# Patient Record
Sex: FEMALE | Race: WHITE | NOT HISPANIC OR LATINO | URBAN - METROPOLITAN AREA
[De-identification: names, ages, dates, MRNs, and addresses within clinical notes are randomized per-mention and may not be internally consistent; named-entity substitution may affect disease eponyms.]

---

## 2018-04-27 ENCOUNTER — INPATIENT (INPATIENT)
Facility: HOSPITAL | Age: 28
LOS: 3 days | Discharge: HOME | End: 2018-05-01
Attending: OBSTETRICS & GYNECOLOGY | Admitting: OBSTETRICS & GYNECOLOGY

## 2018-04-27 VITALS
SYSTOLIC BLOOD PRESSURE: 122 MMHG | TEMPERATURE: 98 F | DIASTOLIC BLOOD PRESSURE: 74 MMHG | RESPIRATION RATE: 16 BRPM | HEART RATE: 69 BPM

## 2018-04-27 DIAGNOSIS — Z34.90 ENCOUNTER FOR SUPERVISION OF NORMAL PREGNANCY, UNSPECIFIED, UNSPECIFIED TRIMESTER: ICD-10-CM

## 2018-04-27 DIAGNOSIS — A49.1 STREPTOCOCCAL INFECTION, UNSPECIFIED SITE: ICD-10-CM

## 2018-04-27 LAB
BASOPHILS # BLD AUTO: 0.04 K/UL — SIGNIFICANT CHANGE UP (ref 0–0.2)
BASOPHILS NFR BLD AUTO: 0.3 % — SIGNIFICANT CHANGE UP (ref 0–1)
BLD GP AB SCN SERPL QL: SIGNIFICANT CHANGE UP
EOSINOPHIL # BLD AUTO: 0.01 K/UL — SIGNIFICANT CHANGE UP (ref 0–0.7)
EOSINOPHIL NFR BLD AUTO: 0.1 % — SIGNIFICANT CHANGE UP (ref 0–8)
HCT VFR BLD CALC: 33.3 % — LOW (ref 37–47)
HGB BLD-MCNC: 11.5 G/DL — LOW (ref 12–16)
IMM GRANULOCYTES NFR BLD AUTO: 0.8 % — HIGH (ref 0.1–0.3)
LYMPHOCYTES # BLD AUTO: 1.37 K/UL — SIGNIFICANT CHANGE UP (ref 1.2–3.4)
LYMPHOCYTES # BLD AUTO: 10.3 % — LOW (ref 20.5–51.1)
MCHC RBC-ENTMCNC: 29 PG — SIGNIFICANT CHANGE UP (ref 27–31)
MCHC RBC-ENTMCNC: 34.5 G/DL — SIGNIFICANT CHANGE UP (ref 32–37)
MCV RBC AUTO: 84.1 FL — SIGNIFICANT CHANGE UP (ref 81–99)
MONOCYTES # BLD AUTO: 0.64 K/UL — HIGH (ref 0.1–0.6)
MONOCYTES NFR BLD AUTO: 4.8 % — SIGNIFICANT CHANGE UP (ref 1.7–9.3)
NEUTROPHILS # BLD AUTO: 11.08 K/UL — HIGH (ref 1.4–6.5)
NEUTROPHILS NFR BLD AUTO: 83.7 % — HIGH (ref 42.2–75.2)
NRBC # BLD: 0 /100 WBCS — SIGNIFICANT CHANGE UP (ref 0–0)
PLATELET # BLD AUTO: 133 K/UL — SIGNIFICANT CHANGE UP (ref 130–400)
PRENATAL SYPHILIS TEST: SIGNIFICANT CHANGE UP
RBC # BLD: 3.96 M/UL — LOW (ref 4.2–5.4)
RBC # FLD: 14.2 % — SIGNIFICANT CHANGE UP (ref 11.5–14.5)
TYPE + AB SCN PNL BLD: SIGNIFICANT CHANGE UP
WBC # BLD: 13.25 K/UL — HIGH (ref 4.8–10.8)
WBC # FLD AUTO: 13.25 K/UL — HIGH (ref 4.8–10.8)

## 2018-04-27 RX ORDER — CITRIC ACID/SODIUM CITRATE 300-500 MG
15 SOLUTION, ORAL ORAL EVERY 4 HOURS
Qty: 0 | Refills: 0 | Status: DISCONTINUED | OUTPATIENT
Start: 2018-04-27 | End: 2018-04-28

## 2018-04-27 RX ORDER — ONDANSETRON 8 MG/1
4 TABLET, FILM COATED ORAL EVERY 6 HOURS
Qty: 0 | Refills: 0 | Status: CANCELLED | OUTPATIENT
Start: 2018-04-27 | End: 2018-05-01

## 2018-04-27 RX ORDER — SODIUM CHLORIDE 9 MG/ML
500 INJECTION, SOLUTION INTRAVENOUS ONCE
Qty: 0 | Refills: 0 | Status: DISCONTINUED | OUTPATIENT
Start: 2018-04-27 | End: 2018-04-28

## 2018-04-27 RX ORDER — AMPICILLIN TRIHYDRATE 250 MG
1 CAPSULE ORAL EVERY 4 HOURS
Qty: 0 | Refills: 0 | Status: DISCONTINUED | OUTPATIENT
Start: 2018-04-27 | End: 2018-04-28

## 2018-04-27 RX ORDER — SODIUM CHLORIDE 9 MG/ML
1000 INJECTION, SOLUTION INTRAVENOUS
Qty: 0 | Refills: 0 | Status: DISCONTINUED | OUTPATIENT
Start: 2018-04-27 | End: 2018-04-28

## 2018-04-27 RX ORDER — NALOXONE HYDROCHLORIDE 4 MG/.1ML
0.1 SPRAY NASAL
Qty: 0 | Refills: 0 | Status: CANCELLED | OUTPATIENT
Start: 2018-04-27 | End: 2018-05-01

## 2018-04-27 RX ORDER — AMPICILLIN TRIHYDRATE 250 MG
2 CAPSULE ORAL ONCE
Qty: 0 | Refills: 0 | Status: COMPLETED | OUTPATIENT
Start: 2018-04-27 | End: 2018-04-27

## 2018-04-27 RX ORDER — OXYTOCIN 10 UNIT/ML
333.33 VIAL (ML) INJECTION
Qty: 20 | Refills: 0 | Status: DISCONTINUED | OUTPATIENT
Start: 2018-04-27 | End: 2018-04-28

## 2018-04-27 RX ORDER — AMPICILLIN TRIHYDRATE 250 MG
CAPSULE ORAL
Qty: 0 | Refills: 0 | Status: DISCONTINUED | OUTPATIENT
Start: 2018-04-27 | End: 2018-04-28

## 2018-04-27 RX ADMIN — Medication 216 GRAM(S): at 15:12

## 2018-04-27 RX ADMIN — Medication 208 GRAM(S): at 18:50

## 2018-04-27 RX ADMIN — Medication 208 GRAM(S): at 23:11

## 2018-04-27 NOTE — OB PROVIDER H&P - ASSESSMENT
27y  @ 40.1 weeks in active labor  Admit to Labor & delivery  IV hydration  IV antibiotics  Pt may ambulate after antibiotic administration  admission labs  Clear fluids  EFM & TOCO monitoring  analgesia prn  D/w Dr Davila 27y  @ 40.1 weeks in active labor  Admit to Labor & delivery  IV hydration  IV antibiotics  admission labs  Clear fluids  EFM & TOCO monitoring  analgesia prn    Dr. Braun and Dr. Davila aware

## 2018-04-27 NOTE — OB PROVIDER H&P - HISTORY OF PRESENT ILLNESS
The pt is a 27y y/o   @ 40.1  weeks  edc 18 dated by lmp confirmed by first trimester sonogram who presents to labor & delivery c/o  contractions  since 330 am with bloody show no LOF, + fetal movement The pt is a 27y y/o   @ 40.1  weeks  edc 18 dated by lmp confirmed by first trimester sonogram s/p ambulation who returns to labor & delivery after ambulating c/o worsening contractions. Denies VB or LOF, + fetal movement.

## 2018-04-28 LAB
BASOPHILS # BLD AUTO: 0.03 K/UL — SIGNIFICANT CHANGE UP (ref 0–0.2)
BASOPHILS NFR BLD AUTO: 0.2 % — SIGNIFICANT CHANGE UP (ref 0–1)
EOSINOPHIL # BLD AUTO: 0.1 K/UL — SIGNIFICANT CHANGE UP (ref 0–0.7)
EOSINOPHIL NFR BLD AUTO: 0.7 % — SIGNIFICANT CHANGE UP (ref 0–8)
HCT VFR BLD CALC: 30.1 % — LOW (ref 37–47)
HGB BLD-MCNC: 10.2 G/DL — LOW (ref 12–16)
IMM GRANULOCYTES NFR BLD AUTO: 1 % — HIGH (ref 0.1–0.3)
LYMPHOCYTES # BLD AUTO: 17.4 % — LOW (ref 20.5–51.1)
LYMPHOCYTES # BLD AUTO: 2.52 K/UL — SIGNIFICANT CHANGE UP (ref 1.2–3.4)
MCHC RBC-ENTMCNC: 29.2 PG — SIGNIFICANT CHANGE UP (ref 27–31)
MCHC RBC-ENTMCNC: 33.9 G/DL — SIGNIFICANT CHANGE UP (ref 32–37)
MCV RBC AUTO: 86.2 FL — SIGNIFICANT CHANGE UP (ref 81–99)
MONOCYTES # BLD AUTO: 0.93 K/UL — HIGH (ref 0.1–0.6)
MONOCYTES NFR BLD AUTO: 6.4 % — SIGNIFICANT CHANGE UP (ref 1.7–9.3)
NEUTROPHILS # BLD AUTO: 10.72 K/UL — HIGH (ref 1.4–6.5)
NEUTROPHILS NFR BLD AUTO: 74.3 % — SIGNIFICANT CHANGE UP (ref 42.2–75.2)
NRBC # BLD: 0 /100 WBCS — SIGNIFICANT CHANGE UP (ref 0–0)
PLATELET # BLD AUTO: 149 K/UL — SIGNIFICANT CHANGE UP (ref 130–400)
RBC # BLD: 3.49 M/UL — LOW (ref 4.2–5.4)
RBC # FLD: 15 % — HIGH (ref 11.5–14.5)
T PALLIDUM AB TITR SER: NEGATIVE — SIGNIFICANT CHANGE UP
WBC # BLD: 14.45 K/UL — HIGH (ref 4.8–10.8)
WBC # FLD AUTO: 14.45 K/UL — HIGH (ref 4.8–10.8)

## 2018-04-28 RX ORDER — SODIUM CHLORIDE 9 MG/ML
3 INJECTION INTRAMUSCULAR; INTRAVENOUS; SUBCUTANEOUS EVERY 8 HOURS
Qty: 0 | Refills: 0 | Status: DISCONTINUED | OUTPATIENT
Start: 2018-04-28 | End: 2018-05-01

## 2018-04-28 RX ORDER — BENZOCAINE 10 %
1 GEL (GRAM) MUCOUS MEMBRANE EVERY 6 HOURS
Qty: 0 | Refills: 0 | Status: DISCONTINUED | OUTPATIENT
Start: 2018-04-28 | End: 2018-05-01

## 2018-04-28 RX ORDER — DIBUCAINE 1 %
1 OINTMENT (GRAM) RECTAL EVERY 4 HOURS
Qty: 0 | Refills: 0 | Status: DISCONTINUED | OUTPATIENT
Start: 2018-04-28 | End: 2018-05-01

## 2018-04-28 RX ORDER — AER TRAVELER 0.5 G/1
1 SOLUTION RECTAL; TOPICAL EVERY 4 HOURS
Qty: 0 | Refills: 0 | Status: DISCONTINUED | OUTPATIENT
Start: 2018-04-28 | End: 2018-05-01

## 2018-04-28 RX ORDER — OXYTOCIN 10 UNIT/ML
41.67 VIAL (ML) INJECTION
Qty: 20 | Refills: 0 | Status: DISCONTINUED | OUTPATIENT
Start: 2018-04-28 | End: 2018-05-01

## 2018-04-28 RX ORDER — SIMETHICONE 80 MG/1
80 TABLET, CHEWABLE ORAL EVERY 6 HOURS
Qty: 0 | Refills: 0 | Status: DISCONTINUED | OUTPATIENT
Start: 2018-04-28 | End: 2018-05-01

## 2018-04-28 RX ORDER — DOCUSATE SODIUM 100 MG
100 CAPSULE ORAL
Qty: 0 | Refills: 0 | Status: DISCONTINUED | OUTPATIENT
Start: 2018-04-28 | End: 2018-05-01

## 2018-04-28 RX ORDER — ACETAMINOPHEN 500 MG
650 TABLET ORAL EVERY 6 HOURS
Qty: 0 | Refills: 0 | Status: DISCONTINUED | OUTPATIENT
Start: 2018-04-28 | End: 2018-05-01

## 2018-04-28 RX ORDER — DIPHENHYDRAMINE HCL 50 MG
25 CAPSULE ORAL EVERY 6 HOURS
Qty: 0 | Refills: 0 | Status: DISCONTINUED | OUTPATIENT
Start: 2018-04-28 | End: 2018-05-01

## 2018-04-28 RX ORDER — LANOLIN
1 OINTMENT (GRAM) TOPICAL EVERY 6 HOURS
Qty: 0 | Refills: 0 | Status: DISCONTINUED | OUTPATIENT
Start: 2018-04-28 | End: 2018-05-01

## 2018-04-28 RX ORDER — IBUPROFEN 200 MG
600 TABLET ORAL EVERY 6 HOURS
Qty: 0 | Refills: 0 | Status: DISCONTINUED | OUTPATIENT
Start: 2018-04-28 | End: 2018-05-01

## 2018-04-28 RX ORDER — MAGNESIUM HYDROXIDE 400 MG/1
30 TABLET, CHEWABLE ORAL
Qty: 0 | Refills: 0 | Status: DISCONTINUED | OUTPATIENT
Start: 2018-04-28 | End: 2018-05-01

## 2018-04-28 RX ORDER — GLYCERIN ADULT
1 SUPPOSITORY, RECTAL RECTAL AT BEDTIME
Qty: 0 | Refills: 0 | Status: DISCONTINUED | OUTPATIENT
Start: 2018-04-28 | End: 2018-05-01

## 2018-04-28 RX ADMIN — SODIUM CHLORIDE 3 MILLILITER(S): 9 INJECTION INTRAMUSCULAR; INTRAVENOUS; SUBCUTANEOUS at 23:59

## 2018-04-28 RX ADMIN — Medication 1 SPRAY(S): at 09:41

## 2018-04-28 RX ADMIN — SODIUM CHLORIDE 3 MILLILITER(S): 9 INJECTION INTRAMUSCULAR; INTRAVENOUS; SUBCUTANEOUS at 14:26

## 2018-04-28 RX ADMIN — Medication 1 TABLET(S): at 12:29

## 2018-04-28 RX ADMIN — SODIUM CHLORIDE 3 MILLILITER(S): 9 INJECTION INTRAMUSCULAR; INTRAVENOUS; SUBCUTANEOUS at 06:26

## 2018-04-28 RX ADMIN — Medication 600 MILLIGRAM(S): at 09:39

## 2018-04-28 RX ADMIN — Medication 600 MILLIGRAM(S): at 16:28

## 2018-04-28 NOTE — OB PROVIDER DELIVERY SUMMARY - NSPROVIDERDELIVERYNOTE_OBGYN_ALL_OB_FT
pt delivered a viable male infant over 2nd degree laceration, placenta delivered intact and spont  pt stable mild lochia   uterus firm   laceration repaired with 2-0 chromic suture in a usual fashion  pt stable   baby to reg nursery.

## 2018-04-29 RX ADMIN — SODIUM CHLORIDE 3 MILLILITER(S): 9 INJECTION INTRAMUSCULAR; INTRAVENOUS; SUBCUTANEOUS at 05:59

## 2018-04-29 NOTE — PROGRESS NOTE ADULT - SUBJECTIVE AND OBJECTIVE BOX
OB attending  PPD #1    Pt doing well, pain well controlled. No overnight events, no acute complaints.    Ambulating: Yes  Voiding: Yes  Flatus: Yes  Bowel movements: Yes   Breast or bottle feeding: Breastfeeding  Diet: Regular    PAST MEDICAL & SURGICAL HISTORY:  No pertinent past medical history  No significant past surgical history      Physical Exam  Vital Signs Last 24 Hrs  T(C): 35.8 (2018 08:15), Max: 36 (2018 16:00)  T(F): 96.5 (2018 08:15), Max: 96.8 (2018 16:00)  HR: 84 (2018 08:15) (82 - 86)  BP: 111/59 (2018 08:15) (111/59 - 123/60)  BP(mean): --  RR: 18 (2018 08:15) (18 - 18)  SpO2: --  Gen: AAOx3, NAD  Abd: Soft, nontender, nondistended, BS+  Fundus: Firm, below umbilicus  Lochia: normal  Ext: No calf tenderness, no swelling    Labs:                        10.2   14.45 )-----------( 149      ( 2018 21:38 )             30.1         A/P: yo G P @ wks, s/p , PPD #1, doing well  - continue current management

## 2018-04-30 ENCOUNTER — TRANSCRIPTION ENCOUNTER (OUTPATIENT)
Age: 28
End: 2018-04-30

## 2018-04-30 RX ORDER — IBUPROFEN 200 MG
1 TABLET ORAL
Qty: 0 | Refills: 0 | DISCHARGE
Start: 2018-04-30

## 2018-04-30 RX ORDER — DIBUCAINE 1 %
1 OINTMENT (GRAM) RECTAL
Qty: 0 | Refills: 0 | DISCHARGE
Start: 2018-04-30

## 2018-04-30 RX ORDER — LANOLIN
1 OINTMENT (GRAM) TOPICAL
Qty: 0 | Refills: 0 | DISCHARGE
Start: 2018-04-30

## 2018-04-30 RX ORDER — BENZOCAINE 10 %
1 GEL (GRAM) MUCOUS MEMBRANE
Qty: 0 | Refills: 0 | DISCHARGE
Start: 2018-04-30

## 2018-04-30 RX ORDER — ACETAMINOPHEN 500 MG
2 TABLET ORAL
Qty: 0 | Refills: 0 | DISCHARGE
Start: 2018-04-30

## 2018-04-30 RX ORDER — DOCUSATE SODIUM 100 MG
1 CAPSULE ORAL
Qty: 0 | Refills: 0 | DISCHARGE
Start: 2018-04-30

## 2018-04-30 RX ADMIN — Medication 600 MILLIGRAM(S): at 07:01

## 2018-04-30 RX ADMIN — Medication 1 TABLET(S): at 12:55

## 2018-04-30 NOTE — DISCHARGE NOTE OB - HOSPITAL COURSE
DATE OF DISCHARGE: 18 @ 09:56    HISTORY OF PRESENT ILLNESS/HOSPITAL COURSE: HPI:  The pt is a 27y y/o   @ 40.1  weeks  edc 18 dated by lmp confirmed by first trimester sonogram s/p ambulation who returns to labor & delivery after ambulating c/o worsening contractions. Denies VB or LOF, + fetal movement. (2018 15:01)    PAST MEDICAL & SURGICAL HISTORY:  No pertinent past medical history  No significant past surgical history      PROCEDURES PERFORMED:      COMPLICATIONS:  none    POST PARTUM COURSE: uncomplicated      FINAL DIAGNOSIS:      LABS:                       10.2   14.45 )-----------( 149      ( 2018 21:38 )             30.1       DISCHARGE INSTRUCTIONS:      If you have severe abdominal pain, heavy vaginal bleeding, shortness of breath or chest pain please call your doctor or come to the emergency room.   DIET:  Advance as tolerated.  ACTIVITY:  Advance as tolerated.  Pelvic rest for 6 weeks.  Nothing to be inserted into the vagina for 6 weeks, i.e. no tampons, douching, sexual relations, or tub baths.   FOLLOW UP: Make an appointment to see your doctor as instructed

## 2018-04-30 NOTE — DISCHARGE NOTE OB - CHANGE SANITARY PADS FREQUENTLY.  WASHING AND WIPING SHOULD OCCUR FROM FRONT TO BACK
Jose Manuel Waldron   MRN: [id number]    Department:  [facility] Emergency Department   Date of Visit:  12/4/2017           Disclosure     Insurance plans vary and the physician(s) referred by the ER may not be covered by your plan.  Please contact your i tell this physician (or your personal doctor if your instructions are to return to your personal doctor) about any new or lasting problems. The primary care or specialist physician will see patients referred from the [facility] Emergency Department.  [facility] Statement Selected

## 2018-04-30 NOTE — DISCHARGE NOTE OB - MEDICATION SUMMARY - MEDICATIONS TO TAKE
I will START or STAY ON the medications listed below when I get home from the hospital:    acetaminophen 325 mg oral tablet  -- 2 tab(s) by mouth every 6 hours, As needed, Mild Pain  -- Indication: For Pain    ibuprofen 600 mg oral tablet  -- 1 tab(s) by mouth every 6 hours, As needed, Moderate Pain  -- Indication: For Pain    benzocaine 20% topical spray  -- 1 spray(s) on skin every 6 hours, As needed, Perineal discomfort  -- Indication: For Pain    dibucaine 1% topical ointment  -- 1 application on skin every 4 hours, As needed, Perineal Discomfort  -- Indication: For hemorrhoids    lanolin topical ointment  -- 1 application on skin every 6 hours, As needed, Sore Nipples  -- Indication: For breastfeeding    docusate sodium 100 mg oral capsule  -- 1 cap(s) by mouth 2 times a day, As needed, Stool Softening  -- Indication: For constipation

## 2018-04-30 NOTE — DISCHARGE NOTE OB - PLAN OF CARE
Healthy mom/ Healthy baby Discharge to home today  Nothing per vagina x 6 wks (no sex, no tampons, no douching)  Follow up 5-6 wks at pmd office

## 2018-04-30 NOTE — DISCHARGE NOTE OB - PATIENT PORTAL LINK FT
You can access the [facility] Patient Portal, offered by [facility], by registering with the following website: [email or web address]

## 2018-04-30 NOTE — DISCHARGE NOTE OB - CARE PLAN
Principal Discharge DX:	Vaginal delivery  Goal:	Healthy mom/ Healthy baby  Assessment and plan of treatment:	Discharge to home today  Nothing per vagina x 6 wks (no sex, no tampons, no douching)  Follow up 5-6 wks at pmd office

## 2018-04-30 NOTE — DISCHARGE NOTE OB - ADDITIONAL INSTRUCTIONS
Discharge to home today  Nothing per vagina x 6 wks (no sex, no tampons, no douching)  Follow up 5-6 wks at pmd office

## 2018-04-30 NOTE — DISCHARGE NOTE OB - CARE PROVIDER_API CALL
Gunnar Davila), Obstetrics and Gynecology  [address] [place] [address]  Phone: [phone]  Fax: [phone]

## 2018-05-01 VITALS
SYSTOLIC BLOOD PRESSURE: 118 MMHG | HEART RATE: 61 BPM | RESPIRATION RATE: 18 BRPM | DIASTOLIC BLOOD PRESSURE: 63 MMHG | TEMPERATURE: 98 F

## 2018-05-11 DIAGNOSIS — O48.0 POST-TERM PREGNANCY: ICD-10-CM

## 2018-05-11 DIAGNOSIS — Z3A.40 40 WEEKS GESTATION OF PREGNANCY: ICD-10-CM

## 2019-08-25 ENCOUNTER — INPATIENT (INPATIENT)
Facility: HOSPITAL | Age: 29
LOS: 1 days | Discharge: HOME | End: 2019-08-27
Attending: OBSTETRICS & GYNECOLOGY | Admitting: OBSTETRICS & GYNECOLOGY
Payer: MEDICAID

## 2019-08-25 VITALS — DIASTOLIC BLOOD PRESSURE: 72 MMHG | HEART RATE: 82 BPM | SYSTOLIC BLOOD PRESSURE: 133 MMHG

## 2019-08-25 LAB
BASOPHILS # BLD AUTO: 0.02 K/UL — SIGNIFICANT CHANGE UP (ref 0–0.2)
BASOPHILS NFR BLD AUTO: 0.1 % — SIGNIFICANT CHANGE UP (ref 0–1)
BLD GP AB SCN SERPL QL: SIGNIFICANT CHANGE UP
EOSINOPHIL # BLD AUTO: 0.01 K/UL — SIGNIFICANT CHANGE UP (ref 0–0.7)
EOSINOPHIL NFR BLD AUTO: 0.1 % — SIGNIFICANT CHANGE UP (ref 0–8)
HCT VFR BLD CALC: 36.4 % — LOW (ref 37–47)
HGB BLD-MCNC: 11.7 G/DL — LOW (ref 12–16)
IMM GRANULOCYTES NFR BLD AUTO: 0.9 % — HIGH (ref 0.1–0.3)
LYMPHOCYTES # BLD AUTO: 1.64 K/UL — SIGNIFICANT CHANGE UP (ref 1.2–3.4)
LYMPHOCYTES # BLD AUTO: 10.9 % — LOW (ref 20.5–51.1)
MCHC RBC-ENTMCNC: 28.4 PG — SIGNIFICANT CHANGE UP (ref 27–31)
MCHC RBC-ENTMCNC: 32.1 G/DL — SIGNIFICANT CHANGE UP (ref 32–37)
MCV RBC AUTO: 88.3 FL — SIGNIFICANT CHANGE UP (ref 81–99)
MONOCYTES # BLD AUTO: 0.72 K/UL — HIGH (ref 0.1–0.6)
MONOCYTES NFR BLD AUTO: 4.8 % — SIGNIFICANT CHANGE UP (ref 1.7–9.3)
NEUTROPHILS # BLD AUTO: 12.51 K/UL — HIGH (ref 1.4–6.5)
NEUTROPHILS NFR BLD AUTO: 83.2 % — HIGH (ref 42.2–75.2)
NRBC # BLD: 0 /100 WBCS — SIGNIFICANT CHANGE UP (ref 0–0)
PLATELET # BLD AUTO: 154 K/UL — SIGNIFICANT CHANGE UP (ref 130–400)
PRENATAL SYPHILIS TEST: SIGNIFICANT CHANGE UP
RBC # BLD: 4.12 M/UL — LOW (ref 4.2–5.4)
RBC # FLD: 15.1 % — HIGH (ref 11.5–14.5)
WBC # BLD: 15.04 K/UL — HIGH (ref 4.8–10.8)
WBC # FLD AUTO: 15.04 K/UL — HIGH (ref 4.8–10.8)

## 2019-08-25 PROCEDURE — 59400 OBSTETRICAL CARE: CPT | Mod: U9

## 2019-08-25 RX ORDER — SODIUM CHLORIDE 9 MG/ML
1000 INJECTION, SOLUTION INTRAVENOUS
Refills: 0 | Status: DISCONTINUED | OUTPATIENT
Start: 2019-08-25 | End: 2019-08-25

## 2019-08-25 RX ORDER — AER TRAVELER 0.5 G/1
1 SOLUTION RECTAL; TOPICAL EVERY 4 HOURS
Refills: 0 | Status: DISCONTINUED | OUTPATIENT
Start: 2019-08-25 | End: 2019-08-27

## 2019-08-25 RX ORDER — OXYCODONE HYDROCHLORIDE 5 MG/1
5 TABLET ORAL
Refills: 0 | Status: DISCONTINUED | OUTPATIENT
Start: 2019-08-25 | End: 2019-08-27

## 2019-08-25 RX ORDER — SIMETHICONE 80 MG/1
80 TABLET, CHEWABLE ORAL EVERY 4 HOURS
Refills: 0 | Status: DISCONTINUED | OUTPATIENT
Start: 2019-08-25 | End: 2019-08-27

## 2019-08-25 RX ORDER — OXYTOCIN 10 UNIT/ML
333.33 VIAL (ML) INJECTION
Qty: 20 | Refills: 0 | Status: DISCONTINUED | OUTPATIENT
Start: 2019-08-25 | End: 2019-08-27

## 2019-08-25 RX ORDER — OXYTOCIN 10 UNIT/ML
333.33 VIAL (ML) INJECTION
Qty: 20 | Refills: 0 | Status: DISCONTINUED | OUTPATIENT
Start: 2019-08-25 | End: 2019-08-25

## 2019-08-25 RX ORDER — AMPICILLIN TRIHYDRATE 250 MG
1 CAPSULE ORAL EVERY 4 HOURS
Refills: 0 | Status: DISCONTINUED | OUTPATIENT
Start: 2019-08-25 | End: 2019-08-25

## 2019-08-25 RX ORDER — CITRIC ACID/SODIUM CITRATE 300-500 MG
15 SOLUTION, ORAL ORAL EVERY 6 HOURS
Refills: 0 | Status: DISCONTINUED | OUTPATIENT
Start: 2019-08-25 | End: 2019-08-25

## 2019-08-25 RX ORDER — ACETAMINOPHEN 500 MG
975 TABLET ORAL
Refills: 0 | Status: DISCONTINUED | OUTPATIENT
Start: 2019-08-25 | End: 2019-08-27

## 2019-08-25 RX ORDER — KETOROLAC TROMETHAMINE 30 MG/ML
30 SYRINGE (ML) INJECTION ONCE
Refills: 0 | Status: DISCONTINUED | OUTPATIENT
Start: 2019-08-25 | End: 2019-08-25

## 2019-08-25 RX ORDER — IBUPROFEN 200 MG
600 TABLET ORAL EVERY 6 HOURS
Refills: 0 | Status: DISCONTINUED | OUTPATIENT
Start: 2019-08-25 | End: 2019-08-27

## 2019-08-25 RX ORDER — SODIUM CHLORIDE 9 MG/ML
3 INJECTION INTRAMUSCULAR; INTRAVENOUS; SUBCUTANEOUS EVERY 8 HOURS
Refills: 0 | Status: DISCONTINUED | OUTPATIENT
Start: 2019-08-25 | End: 2019-08-27

## 2019-08-25 RX ORDER — IBUPROFEN 200 MG
600 TABLET ORAL EVERY 6 HOURS
Refills: 0 | Status: COMPLETED | OUTPATIENT
Start: 2019-08-25 | End: 2020-07-23

## 2019-08-25 RX ORDER — DIPHENHYDRAMINE HCL 50 MG
25 CAPSULE ORAL EVERY 6 HOURS
Refills: 0 | Status: DISCONTINUED | OUTPATIENT
Start: 2019-08-25 | End: 2019-08-27

## 2019-08-25 RX ORDER — DOCUSATE SODIUM 100 MG
100 CAPSULE ORAL
Refills: 0 | Status: DISCONTINUED | OUTPATIENT
Start: 2019-08-25 | End: 2019-08-27

## 2019-08-25 RX ORDER — GLYCERIN ADULT
1 SUPPOSITORY, RECTAL RECTAL AT BEDTIME
Refills: 0 | Status: DISCONTINUED | OUTPATIENT
Start: 2019-08-25 | End: 2019-08-27

## 2019-08-25 RX ORDER — OXYCODONE HYDROCHLORIDE 5 MG/1
5 TABLET ORAL ONCE
Refills: 0 | Status: DISCONTINUED | OUTPATIENT
Start: 2019-08-25 | End: 2019-08-27

## 2019-08-25 RX ORDER — AMPICILLIN TRIHYDRATE 250 MG
2 CAPSULE ORAL ONCE
Refills: 0 | Status: COMPLETED | OUTPATIENT
Start: 2019-08-25 | End: 2019-08-25

## 2019-08-25 RX ORDER — DIBUCAINE 1 %
1 OINTMENT (GRAM) RECTAL EVERY 6 HOURS
Refills: 0 | Status: DISCONTINUED | OUTPATIENT
Start: 2019-08-25 | End: 2019-08-27

## 2019-08-25 RX ORDER — LANOLIN
1 OINTMENT (GRAM) TOPICAL EVERY 6 HOURS
Refills: 0 | Status: DISCONTINUED | OUTPATIENT
Start: 2019-08-25 | End: 2019-08-27

## 2019-08-25 RX ORDER — MAGNESIUM HYDROXIDE 400 MG/1
30 TABLET, CHEWABLE ORAL
Refills: 0 | Status: DISCONTINUED | OUTPATIENT
Start: 2019-08-25 | End: 2019-08-27

## 2019-08-25 RX ORDER — ONDANSETRON 8 MG/1
4 TABLET, FILM COATED ORAL EVERY 6 HOURS
Refills: 0 | Status: DISCONTINUED | OUTPATIENT
Start: 2019-08-25 | End: 2019-08-27

## 2019-08-25 RX ORDER — NALOXONE HYDROCHLORIDE 4 MG/.1ML
0.1 SPRAY NASAL
Refills: 0 | Status: DISCONTINUED | OUTPATIENT
Start: 2019-08-25 | End: 2019-08-27

## 2019-08-25 RX ORDER — BENZOCAINE 10 %
1 GEL (GRAM) MUCOUS MEMBRANE EVERY 6 HOURS
Refills: 0 | Status: DISCONTINUED | OUTPATIENT
Start: 2019-08-25 | End: 2019-08-27

## 2019-08-25 RX ADMIN — Medication 216 GRAM(S): at 18:35

## 2019-08-25 RX ADMIN — Medication 600 MILLIGRAM(S): at 23:31

## 2019-08-25 NOTE — OB PROVIDER H&P - NSHPPHYSICALEXAM_GEN_ALL_CORE
PHYSICAL EXAM:  T(F): 97.8 (08-25 @ 18:21)  HR: 82 (08-25 @ 18:20)  BP: 133/72 (08-25 @ 18:20)  Constitutional: AAOx3, NAD  Abdomen: Soft, gravid, nontender, no palpable ctx  EFM: 140, mod rose, +accel  [place]: q5mins  SVE: 7/100/-1

## 2019-08-25 NOTE — OB PROVIDER H&P - HISTORY OF PRESENT ILLNESS
28  at 31w4d by 2nd trimester sono presents with ctx since 1300, denies LOF or VB. She feels good fetal movement. She had one prior  with  GBS disease, admitted to NICU for 3 days despite receiving GBS prophylaxis.

## 2019-08-25 NOTE — OB PROVIDER DELIVERY SUMMARY - NSPROVIDERDELIVERYNOTE_OBGYN_ALL_OB_FT
Patient was fully dilated and pushing. Fetal head was OA and restituted to LOT. The anterior and posterior shoulders delivered, followed by the remaining body atraumatically. Delayed cord clamping was performed, and then clamped and cut. Cord blood gases collected x2. The  was handed to the to the pediatric team. The placenta delivered intact with membranes. Pitocin was administered. Uterus massaged, fundus found to be firm. Cervix, vagina and perineum inspected 2nd degree laceration was noted, repaired using 2-0 chromic in the usual fashion with good hemostasis.     Viable male infant delivered, weighing [id number], with APGARs 9/9.      Dr. Mistry and Dr. Polk present for the delivery Patient was fully dilated and pushing. NSD live male Apgar 9/9 , Fetal head was OA and restituted to LOT. The anterior and posterior shoulders delivered, followed by the remaining body atraumatically. Delayed cord clamping was performed, and then clamped and cut. Cord blood gases collected x2. The  was handed to the to the pediatric team. The placenta delivered intact with membranes. Pitocin was administered. Uterus massaged, fundus found to be firm. Cervix, vagina and perineum inspected 2nd degree laceration was noted, repaired using 2-0 chromic in the usual fashion with good hemostasis.     Viable male infant delivered, weighing [id number], with APGARs 9/9.    ob Attg: I was present and performed delivery, agree with the above.       Dr. Mistry and Dr. Polk present for the delivery

## 2019-08-25 NOTE — OB PROVIDER H&P - ATTENDING COMMENTS
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

## 2019-08-25 NOTE — OB PROVIDER H&P - ASSESSMENT
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

## 2019-08-25 NOTE — PROCEDURE NOTE - GENERAL PROCEDURE DETAILS
CSE aseptic prep with betadine MARGARET by loss of resistance technique and spinal by needle thru needle technique

## 2019-08-26 LAB
BASOPHILS # BLD AUTO: 0.03 K/UL — SIGNIFICANT CHANGE UP (ref 0–0.2)
BASOPHILS NFR BLD AUTO: 0.2 % — SIGNIFICANT CHANGE UP (ref 0–1)
EOSINOPHIL # BLD AUTO: 0.03 K/UL — SIGNIFICANT CHANGE UP (ref 0–0.7)
EOSINOPHIL NFR BLD AUTO: 0.2 % — SIGNIFICANT CHANGE UP (ref 0–8)
HCT VFR BLD CALC: 29.8 % — LOW (ref 37–47)
HGB BLD-MCNC: 9.8 G/DL — LOW (ref 12–16)
IMM GRANULOCYTES NFR BLD AUTO: 0.9 % — HIGH (ref 0.1–0.3)
LYMPHOCYTES # BLD AUTO: 1.61 K/UL — SIGNIFICANT CHANGE UP (ref 1.2–3.4)
LYMPHOCYTES # BLD AUTO: 13 % — LOW (ref 20.5–51.1)
MCHC RBC-ENTMCNC: 29 PG — SIGNIFICANT CHANGE UP (ref 27–31)
MCHC RBC-ENTMCNC: 32.9 G/DL — SIGNIFICANT CHANGE UP (ref 32–37)
MCV RBC AUTO: 88.2 FL — SIGNIFICANT CHANGE UP (ref 81–99)
MONOCYTES # BLD AUTO: 0.68 K/UL — HIGH (ref 0.1–0.6)
MONOCYTES NFR BLD AUTO: 5.5 % — SIGNIFICANT CHANGE UP (ref 1.7–9.3)
NEUTROPHILS # BLD AUTO: 9.9 K/UL — HIGH (ref 1.4–6.5)
NEUTROPHILS NFR BLD AUTO: 80.2 % — HIGH (ref 42.2–75.2)
NRBC # BLD: 0 /100 WBCS — SIGNIFICANT CHANGE UP (ref 0–0)
PLATELET # BLD AUTO: 150 K/UL — SIGNIFICANT CHANGE UP (ref 130–400)
RBC # BLD: 3.38 M/UL — LOW (ref 4.2–5.4)
RBC # FLD: 15.3 % — HIGH (ref 11.5–14.5)
WBC # BLD: 12.36 K/UL — HIGH (ref 4.8–10.8)
WBC # FLD AUTO: 12.36 K/UL — HIGH (ref 4.8–10.8)

## 2019-08-26 RX ADMIN — Medication 975 MILLIGRAM(S): at 02:39

## 2019-08-26 RX ADMIN — Medication 975 MILLIGRAM(S): at 10:02

## 2019-08-26 RX ADMIN — Medication 600 MILLIGRAM(S): at 13:48

## 2019-08-26 RX ADMIN — Medication 600 MILLIGRAM(S): at 06:23

## 2019-08-26 RX ADMIN — Medication 1 TABLET(S): at 13:47

## 2019-08-26 RX ADMIN — SODIUM CHLORIDE 3 MILLILITER(S): 9 INJECTION INTRAMUSCULAR; INTRAVENOUS; SUBCUTANEOUS at 13:43

## 2019-08-26 RX ADMIN — Medication 975 MILLIGRAM(S): at 09:27

## 2019-08-26 RX ADMIN — Medication 975 MILLIGRAM(S): at 20:35

## 2019-08-27 ENCOUNTER — TRANSCRIPTION ENCOUNTER (OUTPATIENT)
Age: 29
End: 2019-08-27

## 2019-08-27 VITALS
SYSTOLIC BLOOD PRESSURE: 117 MMHG | RESPIRATION RATE: 18 BRPM | HEART RATE: 94 BPM | DIASTOLIC BLOOD PRESSURE: 58 MMHG | TEMPERATURE: 96 F

## 2019-08-27 RX ORDER — ACETAMINOPHEN 500 MG
3 TABLET ORAL
Qty: 0 | Refills: 0 | DISCHARGE
Start: 2019-08-27

## 2019-08-27 RX ORDER — IBUPROFEN 200 MG
1 TABLET ORAL
Qty: 0 | Refills: 0 | DISCHARGE
Start: 2019-08-27

## 2019-08-27 RX ADMIN — Medication 975 MILLIGRAM(S): at 08:54

## 2019-08-27 RX ADMIN — Medication 1 APPLICATION(S): at 03:48

## 2019-08-27 NOTE — DISCHARGE NOTE OB - MEDICATION SUMMARY - MEDICATIONS TO TAKE
I will START or STAY ON the medications listed below when I get home from the hospital:    Actamin 325 mg oral tablet  -- 3 tab(s) by mouth   -- Indication: For postpartum    ibuprofen 600 mg oral tablet  -- 1 tab(s) by mouth every 6 hours  -- Indication: For postpartum

## 2019-08-27 NOTE — DISCHARGE NOTE OB - PLAN OF CARE
healthy No heavy lifting. Nothing inside the vagina for 6 weeks: no tampons, douching, sexual intercourse, tub baths or pools. If you have a fever over 100.4F, severe abdominal pain or heavy vaginal bleeding please call your doctor or go to the emergency room.

## 2019-08-27 NOTE — DISCHARGE NOTE OB - CARE PROVIDER_API CALL
Gunnar Davila)  Obstetrics and Gynecology  [address] [place] [address]  Phone: [phone]  Fax: [phone]  Follow Up Time:

## 2019-08-27 NOTE — DISCHARGE NOTE OB - PATIENT PORTAL LINK FT
You can access the FollowMyHealth Patient Portal offered by [facility] by registering at the following website: [email or web address]. By joining [employer]’s FollowMyHealth portal, you will also be able to view your health information using other applications (apps) compatible with our system.

## 2019-08-27 NOTE — DISCHARGE NOTE OB - MEDICATION SUMMARY - MEDICATIONS TO STOP TAKING
I will STOP taking the medications listed below when I get home from the hospital:    acetaminophen 325 mg oral tablet  -- 2 tab(s) by mouth every 6 hours, As needed, Mild Pain    ibuprofen 600 mg oral tablet  -- 1 tab(s) by mouth every 6 hours, As needed, Moderate Pain    benzocaine 20% topical spray  -- 1 spray(s) on skin every 6 hours, As needed, Perineal discomfort    dibucaine 1% topical ointment  -- 1 application on skin every 4 hours, As needed, Perineal Discomfort    lanolin topical ointment  -- 1 application on skin every 6 hours, As needed, Sore Nipples    docusate sodium 100 mg oral capsule  -- 1 cap(s) by mouth 2 times a day, As needed, Stool Softening

## 2019-08-29 DIAGNOSIS — O26.893 OTHER SPECIFIED PREGNANCY RELATED CONDITIONS, THIRD TRIMESTER: ICD-10-CM

## 2019-08-29 DIAGNOSIS — Z3A.41 41 WEEKS GESTATION OF PREGNANCY: ICD-10-CM

## 2019-10-07 NOTE — PROCEDURE NOTE - ESTIMATED BLOOD LOSS
minimal
32 year old  at 29.4 weeks gestational age admitted for incompetant cervix/  labor    plan discussed with dr vazquez  betamethasone for fetal lung maturity  ampicillin for GBS prophalaxysis  magnesium for tocolysis/ neuroprotection  GBS, u/a urine culture sent  routine orders

## 2020-03-02 NOTE — OB PROVIDER H&P - NS_FHRDECEL_OBGYN_ALL_OB
[de-identified] : At this time, due to a nondisplaced left greater trochanteric fracture, I recommended she continue her walker with touchdown weightbearing and be reassessed in 3 weeks.\par \par I declare responsibility and liability for caring for this fracture for the next 90 days.  No Decelerations

## 2020-06-19 NOTE — OB PROVIDER H&P - ANTIBODY SCREEN: DATE, OB PROFILE
22-Jan-2019 [place] Counseling:  I discussed with the patient the risks of imiquimod including but not limited to erythema, scaling, itching, weeping, crusting, and pain.  Patient understands that the inflammatory response to imiquimod is variable from person to person and was educated regarded proper titration schedule.  If flu-like symptoms develop, patient knows to discontinue the medication and contact us.

## 2020-07-22 ENCOUNTER — FORM ENCOUNTER (OUTPATIENT)
Age: 30
End: 2020-07-22

## 2020-08-17 ENCOUNTER — FORM ENCOUNTER (OUTPATIENT)
Age: 30
End: 2020-08-17

## 2020-09-25 NOTE — OB RN DELIVERY SUMMARY - AMNIOTIC FLUID AMOUNT, LABOR
Detail Level: Zone Detail Level: Generalized Detail Level: Detailed Detail Level: Simple within normal limits

## 2020-11-30 ENCOUNTER — FORM ENCOUNTER (OUTPATIENT)
Age: 30
End: 2020-11-30

## 2021-01-27 ENCOUNTER — FORM ENCOUNTER (OUTPATIENT)
Age: 31
End: 2021-01-27

## 2021-03-01 ENCOUNTER — FORM ENCOUNTER (OUTPATIENT)
Age: 31
End: 2021-03-01

## 2021-03-03 ENCOUNTER — OUTPATIENT (OUTPATIENT)
Dept: OUTPATIENT SERVICES | Facility: HOSPITAL | Age: 31
LOS: 1 days | Discharge: HOME | End: 2021-03-03
Payer: MEDICAID

## 2021-03-03 VITALS
TEMPERATURE: 98 F | RESPIRATION RATE: 18 BRPM | SYSTOLIC BLOOD PRESSURE: 116 MMHG | DIASTOLIC BLOOD PRESSURE: 61 MMHG | HEART RATE: 80 BPM

## 2021-03-03 VITALS — DIASTOLIC BLOOD PRESSURE: 63 MMHG | SYSTOLIC BLOOD PRESSURE: 115 MMHG | HEART RATE: 74 BPM

## 2021-03-03 PROCEDURE — 99213 OFFICE O/P EST LOW 20 MIN: CPT | Mod: 25

## 2021-03-03 PROCEDURE — 76815 OB US LIMITED FETUS(S): CPT | Mod: 26

## 2021-03-03 PROCEDURE — 59025 FETAL NON-STRESS TEST: CPT | Mod: 26,59

## 2021-03-03 NOTE — OB PROVIDER TRIAGE NOTE - NSHPPHYSICALEXAM_GEN_ALL_CORE
PHYSICAL EXAM:  T(F): 97.8 (03-03 @ 07:54)  HR: 74 (03-03 @ 08:49)  BP: 115/63 (03-03 @ 08:49)  RR: 18 (03-03 @ 07:54)  Constitutional: AAOx3, NAD  Abdomen: Soft, gravid, nontender, no palpable ctx  ft/long- 3 by Dr Fernandez  CAT I tracing

## 2021-03-03 NOTE — OB PROVIDER TRIAGE NOTE - HISTORY OF PRESENT ILLNESS
29 y/o  @ 41.1 weeks janiya 21 dated by lmp who presents to l & D c/o contractions _+ fetal movement no srom no vaginal bleeding

## 2021-03-03 NOTE — OB PROVIDER TRIAGE NOTE - NSHPLABSRESULTS_GEN_ALL_CORE
7/24/20  Hep sag negative  RPR NR  HIV NR 7/24/20 & 1/28/21  GBS negative 1/28/21  Rub IMM  Measles IMM  GCT 94

## 2021-03-03 NOTE — OB PROVIDER TRIAGE NOTE - NSOBPROVIDERNOTE_OBGYN_ALL_OB_FT
41.1 week IUP reactive NST BPP 10/10  d/c to home with labor precautions  Dr Fernandez aware 41.1 week IUP reactive NST BPP 10/10  d/c to home with labor precautions  Dr Fernandez aware  patient seen and evaluated for irreg ctx, cx unfavorable, offered induction, delcines, wants to ambulate and will return for exam  reassuring fetal testing

## 2021-03-04 ENCOUNTER — INPATIENT (INPATIENT)
Facility: HOSPITAL | Age: 31
LOS: 1 days | Discharge: HOME | End: 2021-03-06
Attending: STUDENT IN AN ORGANIZED HEALTH CARE EDUCATION/TRAINING PROGRAM | Admitting: STUDENT IN AN ORGANIZED HEALTH CARE EDUCATION/TRAINING PROGRAM
Payer: MEDICAID

## 2021-03-05 VITALS
DIASTOLIC BLOOD PRESSURE: 67 MMHG | HEART RATE: 82 BPM | SYSTOLIC BLOOD PRESSURE: 128 MMHG | TEMPERATURE: 98 F | RESPIRATION RATE: 20 BRPM

## 2021-03-05 LAB
AMPHET UR-MCNC: NEGATIVE — SIGNIFICANT CHANGE UP
APPEARANCE UR: CLEAR — SIGNIFICANT CHANGE UP
BARBITURATES UR SCN-MCNC: NEGATIVE — SIGNIFICANT CHANGE UP
BASOPHILS # BLD AUTO: 0.02 K/UL — SIGNIFICANT CHANGE UP (ref 0–0.2)
BASOPHILS # BLD AUTO: 0.03 K/UL — SIGNIFICANT CHANGE UP (ref 0–0.2)
BASOPHILS NFR BLD AUTO: 0.2 % — SIGNIFICANT CHANGE UP (ref 0–1)
BASOPHILS NFR BLD AUTO: 0.2 % — SIGNIFICANT CHANGE UP (ref 0–1)
BENZODIAZ UR-MCNC: NEGATIVE — SIGNIFICANT CHANGE UP
BILIRUB UR-MCNC: NEGATIVE — SIGNIFICANT CHANGE UP
BLD GP AB SCN SERPL QL: SIGNIFICANT CHANGE UP
BUPRENORPHINE SCREEN, URINE RESULT: NEGATIVE — SIGNIFICANT CHANGE UP
COCAINE METAB.OTHER UR-MCNC: NEGATIVE — SIGNIFICANT CHANGE UP
COLOR SPEC: COLORLESS — SIGNIFICANT CHANGE UP
DIFF PNL FLD: NEGATIVE — SIGNIFICANT CHANGE UP
EOSINOPHIL # BLD AUTO: 0.03 K/UL — SIGNIFICANT CHANGE UP (ref 0–0.7)
EOSINOPHIL # BLD AUTO: 0.05 K/UL — SIGNIFICANT CHANGE UP (ref 0–0.7)
EOSINOPHIL NFR BLD AUTO: 0.2 % — SIGNIFICANT CHANGE UP (ref 0–8)
EOSINOPHIL NFR BLD AUTO: 0.6 % — SIGNIFICANT CHANGE UP (ref 0–8)
FENTANYL UR QL: NEGATIVE — SIGNIFICANT CHANGE UP
GLUCOSE UR QL: NEGATIVE — SIGNIFICANT CHANGE UP
HCT VFR BLD CALC: 32.5 % — LOW (ref 37–47)
HCT VFR BLD CALC: 37.4 % — SIGNIFICANT CHANGE UP (ref 37–47)
HGB BLD-MCNC: 10.7 G/DL — LOW (ref 12–16)
HGB BLD-MCNC: 12.4 G/DL — SIGNIFICANT CHANGE UP (ref 12–16)
IMM GRANULOCYTES NFR BLD AUTO: 0.6 % — HIGH (ref 0.1–0.3)
IMM GRANULOCYTES NFR BLD AUTO: 0.6 % — HIGH (ref 0.1–0.3)
KETONES UR-MCNC: NEGATIVE — SIGNIFICANT CHANGE UP
L&D DRUG SCREEN, URINE: SIGNIFICANT CHANGE UP
LEUKOCYTE ESTERASE UR-ACNC: NEGATIVE — SIGNIFICANT CHANGE UP
LYMPHOCYTES # BLD AUTO: 0.83 K/UL — LOW (ref 1.2–3.4)
LYMPHOCYTES # BLD AUTO: 1.5 K/UL — SIGNIFICANT CHANGE UP (ref 1.2–3.4)
LYMPHOCYTES # BLD AUTO: 12 % — LOW (ref 20.5–51.1)
LYMPHOCYTES # BLD AUTO: 9.9 % — LOW (ref 20.5–51.1)
MCHC RBC-ENTMCNC: 28.5 PG — SIGNIFICANT CHANGE UP (ref 27–31)
MCHC RBC-ENTMCNC: 28.8 PG — SIGNIFICANT CHANGE UP (ref 27–31)
MCHC RBC-ENTMCNC: 32.9 G/DL — SIGNIFICANT CHANGE UP (ref 32–37)
MCHC RBC-ENTMCNC: 33.2 G/DL — SIGNIFICANT CHANGE UP (ref 32–37)
MCV RBC AUTO: 86 FL — SIGNIFICANT CHANGE UP (ref 81–99)
MCV RBC AUTO: 87.4 FL — SIGNIFICANT CHANGE UP (ref 81–99)
METHADONE UR-MCNC: NEGATIVE — SIGNIFICANT CHANGE UP
MONOCYTES # BLD AUTO: 0.4 K/UL — SIGNIFICANT CHANGE UP (ref 0.1–0.6)
MONOCYTES # BLD AUTO: 0.56 K/UL — SIGNIFICANT CHANGE UP (ref 0.1–0.6)
MONOCYTES NFR BLD AUTO: 4.5 % — SIGNIFICANT CHANGE UP (ref 1.7–9.3)
MONOCYTES NFR BLD AUTO: 4.8 % — SIGNIFICANT CHANGE UP (ref 1.7–9.3)
NEUTROPHILS # BLD AUTO: 10.34 K/UL — HIGH (ref 1.4–6.5)
NEUTROPHILS # BLD AUTO: 7.04 K/UL — HIGH (ref 1.4–6.5)
NEUTROPHILS NFR BLD AUTO: 82.5 % — HIGH (ref 42.2–75.2)
NEUTROPHILS NFR BLD AUTO: 83.9 % — HIGH (ref 42.2–75.2)
NITRITE UR-MCNC: NEGATIVE — SIGNIFICANT CHANGE UP
NRBC # BLD: 0 /100 WBCS — SIGNIFICANT CHANGE UP (ref 0–0)
NRBC # BLD: 0 /100 WBCS — SIGNIFICANT CHANGE UP (ref 0–0)
OPIATES UR-MCNC: NEGATIVE — SIGNIFICANT CHANGE UP
OXYCODONE UR-MCNC: NEGATIVE — SIGNIFICANT CHANGE UP
PCP UR-MCNC: NEGATIVE — SIGNIFICANT CHANGE UP
PH UR: 7 — SIGNIFICANT CHANGE UP (ref 5–8)
PLATELET # BLD AUTO: 118 K/UL — LOW (ref 130–400)
PLATELET # BLD AUTO: 147 K/UL — SIGNIFICANT CHANGE UP (ref 130–400)
PRENATAL SYPHILIS TEST: SIGNIFICANT CHANGE UP
PROPOXYPHENE QUALITATIVE URINE RESULT: NEGATIVE — SIGNIFICANT CHANGE UP
PROT UR-MCNC: NEGATIVE — SIGNIFICANT CHANGE UP
RBC # BLD: 3.72 M/UL — LOW (ref 4.2–5.4)
RBC # BLD: 4.35 M/UL — SIGNIFICANT CHANGE UP (ref 4.2–5.4)
RBC # FLD: 14.1 % — SIGNIFICANT CHANGE UP (ref 11.5–14.5)
RBC # FLD: 14.3 % — SIGNIFICANT CHANGE UP (ref 11.5–14.5)
SARS-COV-2 IGG SERPL QL IA: NEGATIVE — SIGNIFICANT CHANGE UP
SARS-COV-2 IGM SERPL IA-ACNC: 0.06 INDEX — SIGNIFICANT CHANGE UP
SARS-COV-2 RNA SPEC QL NAA+PROBE: SIGNIFICANT CHANGE UP
SP GR SPEC: 1.01 — SIGNIFICANT CHANGE UP (ref 1.01–1.03)
UROBILINOGEN FLD QL: SIGNIFICANT CHANGE UP
WBC # BLD: 12.53 K/UL — HIGH (ref 4.8–10.8)
WBC # BLD: 8.39 K/UL — SIGNIFICANT CHANGE UP (ref 4.8–10.8)
WBC # FLD AUTO: 12.53 K/UL — HIGH (ref 4.8–10.8)
WBC # FLD AUTO: 8.39 K/UL — SIGNIFICANT CHANGE UP (ref 4.8–10.8)

## 2021-03-05 PROCEDURE — 59400 OBSTETRICAL CARE: CPT | Mod: U9

## 2021-03-05 RX ORDER — BENZOCAINE 10 %
1 GEL (GRAM) MUCOUS MEMBRANE EVERY 6 HOURS
Refills: 0 | Status: DISCONTINUED | OUTPATIENT
Start: 2021-03-05 | End: 2021-03-06

## 2021-03-05 RX ORDER — SODIUM CHLORIDE 9 MG/ML
3 INJECTION INTRAMUSCULAR; INTRAVENOUS; SUBCUTANEOUS EVERY 8 HOURS
Refills: 0 | Status: DISCONTINUED | OUTPATIENT
Start: 2021-03-05 | End: 2021-03-06

## 2021-03-05 RX ORDER — MAGNESIUM HYDROXIDE 400 MG/1
30 TABLET, CHEWABLE ORAL
Refills: 0 | Status: DISCONTINUED | OUTPATIENT
Start: 2021-03-05 | End: 2021-03-06

## 2021-03-05 RX ORDER — OXYTOCIN 10 UNIT/ML
333.33 VIAL (ML) INJECTION
Qty: 20 | Refills: 0 | Status: DISCONTINUED | OUTPATIENT
Start: 2021-03-05 | End: 2021-03-05

## 2021-03-05 RX ORDER — INFLUENZA VIRUS VACCINE 15; 15; 15; 15 UG/.5ML; UG/.5ML; UG/.5ML; UG/.5ML
0.5 SUSPENSION INTRAMUSCULAR ONCE
Refills: 0 | Status: DISCONTINUED | OUTPATIENT
Start: 2021-03-05 | End: 2021-03-05

## 2021-03-05 RX ORDER — AER TRAVELER 0.5 G/1
1 SOLUTION RECTAL; TOPICAL EVERY 4 HOURS
Refills: 0 | Status: DISCONTINUED | OUTPATIENT
Start: 2021-03-05 | End: 2021-03-06

## 2021-03-05 RX ORDER — TETANUS TOXOID, REDUCED DIPHTHERIA TOXOID AND ACELLULAR PERTUSSIS VACCINE, ADSORBED 5; 2.5; 8; 8; 2.5 [IU]/.5ML; [IU]/.5ML; UG/.5ML; UG/.5ML; UG/.5ML
0.5 SUSPENSION INTRAMUSCULAR ONCE
Refills: 0 | Status: DISCONTINUED | OUTPATIENT
Start: 2021-03-05 | End: 2021-03-05

## 2021-03-05 RX ORDER — OXYCODONE HYDROCHLORIDE 5 MG/1
5 TABLET ORAL
Refills: 0 | Status: DISCONTINUED | OUTPATIENT
Start: 2021-03-05 | End: 2021-03-06

## 2021-03-05 RX ORDER — ACETAMINOPHEN 500 MG
975 TABLET ORAL
Refills: 0 | Status: DISCONTINUED | OUTPATIENT
Start: 2021-03-05 | End: 2021-03-06

## 2021-03-05 RX ORDER — PRAMOXINE HYDROCHLORIDE 150 MG/15G
1 AEROSOL, FOAM RECTAL EVERY 4 HOURS
Refills: 0 | Status: DISCONTINUED | OUTPATIENT
Start: 2021-03-05 | End: 2021-03-06

## 2021-03-05 RX ORDER — LANOLIN
1 OINTMENT (GRAM) TOPICAL EVERY 6 HOURS
Refills: 0 | Status: DISCONTINUED | OUTPATIENT
Start: 2021-03-05 | End: 2021-03-06

## 2021-03-05 RX ORDER — HYDROCORTISONE 1 %
1 OINTMENT (GRAM) TOPICAL EVERY 6 HOURS
Refills: 0 | Status: DISCONTINUED | OUTPATIENT
Start: 2021-03-05 | End: 2021-03-06

## 2021-03-05 RX ORDER — SIMETHICONE 80 MG/1
80 TABLET, CHEWABLE ORAL EVERY 4 HOURS
Refills: 0 | Status: DISCONTINUED | OUTPATIENT
Start: 2021-03-05 | End: 2021-03-06

## 2021-03-05 RX ORDER — OXYTOCIN 10 UNIT/ML
333.33 VIAL (ML) INJECTION
Qty: 20 | Refills: 0 | Status: DISCONTINUED | OUTPATIENT
Start: 2021-03-05 | End: 2021-03-06

## 2021-03-05 RX ORDER — DIPHENHYDRAMINE HCL 50 MG
25 CAPSULE ORAL EVERY 6 HOURS
Refills: 0 | Status: DISCONTINUED | OUTPATIENT
Start: 2021-03-05 | End: 2021-03-06

## 2021-03-05 RX ORDER — IBUPROFEN 200 MG
600 TABLET ORAL EVERY 6 HOURS
Refills: 0 | Status: COMPLETED | OUTPATIENT
Start: 2021-03-05 | End: 2022-02-01

## 2021-03-05 RX ORDER — OXYCODONE HYDROCHLORIDE 5 MG/1
5 TABLET ORAL ONCE
Refills: 0 | Status: DISCONTINUED | OUTPATIENT
Start: 2021-03-05 | End: 2021-03-06

## 2021-03-05 RX ORDER — FAMOTIDINE 10 MG/ML
20 INJECTION INTRAVENOUS ONCE
Refills: 0 | Status: COMPLETED | OUTPATIENT
Start: 2021-03-05 | End: 2021-03-05

## 2021-03-05 RX ORDER — KETOROLAC TROMETHAMINE 30 MG/ML
30 SYRINGE (ML) INJECTION ONCE
Refills: 0 | Status: DISCONTINUED | OUTPATIENT
Start: 2021-03-05 | End: 2021-03-05

## 2021-03-05 RX ORDER — DIBUCAINE 1 %
1 OINTMENT (GRAM) RECTAL EVERY 6 HOURS
Refills: 0 | Status: DISCONTINUED | OUTPATIENT
Start: 2021-03-05 | End: 2021-03-06

## 2021-03-05 RX ORDER — IBUPROFEN 200 MG
600 TABLET ORAL EVERY 6 HOURS
Refills: 0 | Status: DISCONTINUED | OUTPATIENT
Start: 2021-03-05 | End: 2021-03-06

## 2021-03-05 RX ORDER — SODIUM CHLORIDE 9 MG/ML
1000 INJECTION, SOLUTION INTRAVENOUS
Refills: 0 | Status: DISCONTINUED | OUTPATIENT
Start: 2021-03-05 | End: 2021-03-05

## 2021-03-05 RX ADMIN — FAMOTIDINE 20 MILLIGRAM(S): 10 INJECTION INTRAVENOUS at 22:30

## 2021-03-05 RX ADMIN — SODIUM CHLORIDE 3 MILLILITER(S): 9 INJECTION INTRAMUSCULAR; INTRAVENOUS; SUBCUTANEOUS at 15:01

## 2021-03-05 RX ADMIN — Medication 975 MILLIGRAM(S): at 21:35

## 2021-03-05 RX ADMIN — Medication 1 TABLET(S): at 15:01

## 2021-03-05 RX ADMIN — Medication 30 MILLIGRAM(S): at 05:24

## 2021-03-05 RX ADMIN — SODIUM CHLORIDE 3 MILLILITER(S): 9 INJECTION INTRAMUSCULAR; INTRAVENOUS; SUBCUTANEOUS at 21:28

## 2021-03-05 RX ADMIN — Medication 975 MILLIGRAM(S): at 10:00

## 2021-03-05 RX ADMIN — Medication 975 MILLIGRAM(S): at 15:01

## 2021-03-05 RX ADMIN — Medication 975 MILLIGRAM(S): at 09:58

## 2021-03-05 NOTE — OB PROVIDER H&P - NSHPPHYSICALEXAM_GEN_ALL_CORE
T(C): 36.7 (03-05-21 @ 00:19), Max: 36.7 (03-05-21 @ 00:19)  HR: 79 (03-05-21 @ 01:09) (66 - 83)  BP: 119/64 (03-05-21 @ 01:09) (119/64 - 128/67)  RR: 18 (03-05-21 @ 00:19) (18 - 20)  BMI (kg/m2): 33.6 (03-05-21 @ 00:19)    Gen: A+OX3. NAD  Abd: Soft, Nontender. Gravid.  FHR: 130bpm /mod rose/accels+  TOCO: irregular   SVE: 6/80/-2, vertex intact    EFW by Leopolds: 8lbs

## 2021-03-05 NOTE — OB PROVIDER H&P - ASSESSMENT
31yo  at 41w3d, GBS negative, in labor.   -Admit to L+D  -Monitor EFM and TOCO   -IVF and labs  -Pain control PRN  -Clear liquid diet as tolerated  -GBS 36 days from test    Dr. Patel aware

## 2021-03-05 NOTE — OB PROVIDER H&P - HISTORY OF PRESENT ILLNESS
31yo  at 41w3d, dated by LMP, 20, with ALEX 21, with contractions since 1800. She denies LOF, VB and reports good FM. No complications this pregnancy.

## 2021-03-05 NOTE — OB RN PATIENT PROFILE - NS_OBGYNHISTORY_OBGYN_ALL_OB_FT
OB: NSVDX2, largest 9-11  Gyn: Denies history of abnormal papsmears, STIs, uterine fibroids or ovarian cysts

## 2021-03-05 NOTE — OB PROVIDER DELIVERY SUMMARY - NSPROVIDERDELIVERYNOTE_OBGYN_ALL_OB_FT
Patient found to be FD and pushing effectively to delivery of healthy infant in JAVY position at 04:35, APGAR 9/9. Infant cried spontaneously and moved all four limbs. Delayed cord clamping performed. Placenta delivered intact, 3VC noted. Uterus massaged and firm. Cervix and vagina intact. Perineum with 2cm 2nd degree laceration reparied with 2-0 CT in usual fashion. EBL 200cc

## 2021-03-06 ENCOUNTER — TRANSCRIPTION ENCOUNTER (OUTPATIENT)
Age: 31
End: 2021-03-06

## 2021-03-06 VITALS
TEMPERATURE: 97 F | RESPIRATION RATE: 18 BRPM | DIASTOLIC BLOOD PRESSURE: 57 MMHG | HEART RATE: 76 BPM | SYSTOLIC BLOOD PRESSURE: 111 MMHG

## 2021-03-06 LAB
SARS-COV-2 IGG SERPL QL IA: NEGATIVE — SIGNIFICANT CHANGE UP
SARS-COV-2 IGM SERPL IA-ACNC: 0.07 INDEX — SIGNIFICANT CHANGE UP

## 2021-03-06 RX ORDER — INFLUENZA VIRUS VACCINE 15; 15; 15; 15 UG/.5ML; UG/.5ML; UG/.5ML; UG/.5ML
0.5 SUSPENSION INTRAMUSCULAR ONCE
Refills: 0 | Status: DISCONTINUED | OUTPATIENT
Start: 2021-03-06 | End: 2021-03-06

## 2021-03-06 RX ORDER — IBUPROFEN 200 MG
1 TABLET ORAL
Qty: 0 | Refills: 0 | DISCHARGE
Start: 2021-03-06

## 2021-03-06 RX ORDER — ACETAMINOPHEN 500 MG
3 TABLET ORAL
Qty: 0 | Refills: 0 | DISCHARGE
Start: 2021-03-06

## 2021-03-06 RX ADMIN — Medication 600 MILLIGRAM(S): at 12:06

## 2021-03-06 RX ADMIN — Medication 600 MILLIGRAM(S): at 06:53

## 2021-03-06 RX ADMIN — Medication 600 MILLIGRAM(S): at 01:20

## 2021-03-06 RX ADMIN — SODIUM CHLORIDE 3 MILLILITER(S): 9 INJECTION INTRAMUSCULAR; INTRAVENOUS; SUBCUTANEOUS at 06:17

## 2021-03-06 RX ADMIN — Medication 600 MILLIGRAM(S): at 00:34

## 2021-03-06 RX ADMIN — Medication 600 MILLIGRAM(S): at 12:30

## 2021-03-06 NOTE — PROGRESS NOTE ADULT - SUBJECTIVE AND OBJECTIVE BOX
OB attending  PPD #1    Pt doing well, pain well controlled. No overnight events, no acute complaints.    Ambulating: Yes  Voiding: Yes  Flatus: Yes  Bowel movements: Yes   Breast or bottle feeding: Breastfeeding  Diet: Regular    PAST MEDICAL & SURGICAL HISTORY:  GBS carrier  Infant had  GBS disease    No significant past surgical history        Physical Exam  Vital Signs Last 24 Hrs  T(C): 36.4 (06 Mar 2021 08:06), Max: 36.4 (06 Mar 2021 08:06)  T(F): 97.6 (06 Mar 2021 08:06), Max: 97.6 (06 Mar 2021 08:06)  HR: 72 (06 Mar 2021 08:06) (72 - 76)  BP: 109/54 (06 Mar 2021 08:06) (94/45 - 112/58)  BP(mean): --  RR: 19 (06 Mar 2021 08:06) (18 - 19)  SpO2: --  Gen: AAOx3, NAD  Abd: Soft, nontender, nondistended, BS+  Fundus: Firm, below umbilicus  Lochia: normal  Ext: No calf tenderness, no swelling    Labs:                        10.7   8.39  )-----------( 118      ( 05 Mar 2021 19:11 )             32.5     Rh+    A/P:  s/p , PPD #1, doing well  - continue current management  -d/c home with instructions  -f/u for pp exam 4-6 wks

## 2021-03-06 NOTE — DISCHARGE NOTE OB - MEDICATION SUMMARY - MEDICATIONS TO TAKE
I will START or STAY ON the medications listed below when I get home from the hospital:    acetaminophen 325 mg oral tablet  -- 3 tab(s) by mouth every 6 hours, As Needed  -- Indication: For moderate pain    ibuprofen 600 mg oral tablet  -- 1 tab(s) by mouth every 6 hours, As Needed  -- Indication: For moderate pain

## 2021-03-06 NOTE — DISCHARGE NOTE OB - CARE PROVIDER_API CALL
Rizwan Polk)  Obstetrics and Gynecology  [address] [place] [address]  Phone: [phone]  Fax: [phone]  Follow Up Time:

## 2021-03-06 NOTE — DISCHARGE NOTE OB - PATIENT PORTAL LINK FT
You can access the FollowMyHealth Patient Portal offered by [facility] by registering at the following website: [email or web address]. By joining [employer]’s FollowMyHealth portal, you will also be able to view your health information using other applications (apps) compatible with our system.

## 2021-03-09 DIAGNOSIS — O48.0 POST-TERM PREGNANCY: ICD-10-CM

## 2021-03-09 DIAGNOSIS — Z3A.41 41 WEEKS GESTATION OF PREGNANCY: ICD-10-CM

## 2021-05-14 NOTE — OB PROVIDER H&P - NS_PRENATALLABSOURCEGBS36PN_OBGYN_ALL_OB
Problem: Discharge Planning:  Goal: Participates in care planning  Description: Participates in care planning  Outcome: Ongoing  Goal: Discharged to appropriate level of care  Description: Discharged to appropriate level of care  Outcome: Ongoing     Problem: Airway Clearance - Ineffective:  Goal: Ability to maintain a clear airway will improve  Description: Ability to maintain a clear airway will improve  Outcome: Ongoing     Problem: Anxiety/Stress:  Goal: Level of anxiety will decrease  Description: Level of anxiety will decrease  Outcome: Ongoing     Problem: Aspiration:  Goal: Absence of aspiration  Description: Absence of aspiration  Outcome: Ongoing     Problem: Bowel Function - Altered:  Goal: Bowel elimination is within specified parameters  Description: Bowel elimination is within specified parameters  Outcome: Ongoing     Problem: Cardiac Output - Decreased:  Goal: Hemodynamic stability will improve  Description: Hemodynamic stability will improve  5/14/2021 0759 by Verner Hoh, APRN - CNP  Outcome: Ongoing  5/13/2021 2329 by Iris Ho RN  Outcome: Ongoing  Note: Pulse rate and rhythm, peripheral pulses, and capillary refill assessed every shift with assessment. General color and body temperature monitored throughout shift and with vitals. Assess for edema with head to toe assessment. Administer treatments and medications as ordered. Monitor patient's weight.       Problem: Fluid Volume - Imbalance:  Goal: Absence of imbalanced fluid volume signs and symptoms  Description: Absence of imbalanced fluid volume signs and symptoms  Outcome: Ongoing     Problem: Gas Exchange - Impaired:  Goal: Levels of oxygenation will improve  Description: Levels of oxygenation will improve  Outcome: Ongoing     Problem: Mental Status - Impaired:  Goal: Mental status will be restored to baseline  Description: Mental status will be restored to baseline  Outcome: Ongoing     Problem: Nutrition Deficit:  Goal: Ability to achieve adequate nutritional intake will improve  Description: Ability to achieve adequate nutritional intake will improve  Outcome: Ongoing     Problem: Pain:  Goal: Pain level will decrease  Description: Pain level will decrease  5/14/2021 0759 by AMBER Sosa CNP  Outcome: Ongoing  5/13/2021 2329 by Carlyn Kasper RN  Outcome: Ongoing  Goal: Recognizes and communicates pain  Description: Recognizes and communicates pain  Outcome: Ongoing  Goal: Control of acute pain  Description: Control of acute pain  Outcome: Ongoing  Goal: Control of chronic pain  Description: Control of chronic pain  Outcome: Ongoing     Problem: Serum Glucose Level - Abnormal:  Goal: Ability to maintain appropriate glucose levels will improve to within specified parameters  Description: Ability to maintain appropriate glucose levels will improve to within specified parameters  Outcome: Ongoing     Problem: Skin Integrity - Impaired:  Goal: Will show no infection signs and symptoms  Description: Will show no infection signs and symptoms  Outcome: Ongoing  Goal: Absence of new skin breakdown  Description: Absence of new skin breakdown  Outcome: Ongoing     Problem: Sleep Pattern Disturbance:  Goal: Appears well-rested  Description: Appears well-rested  Outcome: Ongoing     Problem: Tissue Perfusion, Altered:  Goal: Circulatory function within specified parameters  Description: Circulatory function within specified parameters  Outcome: Ongoing     Problem: Tissue Perfusion - Cardiopulmonary, Altered:  Goal: Absence of angina  Description: Absence of angina  Outcome: Ongoing  Goal: Hemodynamic stability will improve  Description: Hemodynamic stability will improve  Outcome: Ongoing     Problem: Nutrition  Goal: Optimal nutrition therapy  Outcome: Ongoing     Problem: Falls - Risk of:  Goal: Will remain free from falls  Description: Will remain free from falls  5/14/2021 0759 by AMBER Sosa CNP  Outcome: Ongoing  5/13/2021 2329 by Meeta Cook RN  Outcome: Ongoing  Note: Pt remains free of falls. Fall risk protocol in place. Bed locked in lowest position. Bed alarm engaged, room door left open. Will continue to monitor.    Goal: Absence of physical injury  Description: Absence of physical injury  Outcome: Ongoing     Problem: Skin Integrity:  Goal: Will show no infection signs and symptoms  Description: Will show no infection signs and symptoms  Outcome: Ongoing  Goal: Absence of new skin breakdown  Description: Absence of new skin breakdown  Outcome: Ongoing positive

## 2022-06-29 ENCOUNTER — FORM ENCOUNTER (OUTPATIENT)
Age: 32
End: 2022-06-29

## 2022-06-30 VITALS
HEIGHT: 66 IN | DIASTOLIC BLOOD PRESSURE: 65 MMHG | WEIGHT: 184 LBS | BODY MASS INDEX: 29.57 KG/M2 | SYSTOLIC BLOOD PRESSURE: 113 MMHG

## 2022-07-05 ENCOUNTER — FORM ENCOUNTER (OUTPATIENT)
Age: 32
End: 2022-07-05

## 2022-07-14 ENCOUNTER — NON-APPOINTMENT (OUTPATIENT)
Age: 32
End: 2022-07-14

## 2022-07-14 DIAGNOSIS — Z78.9 OTHER SPECIFIED HEALTH STATUS: ICD-10-CM

## 2022-07-14 DIAGNOSIS — Z87.440 SUPERVISION OF OTHER HIGH RISK PREGNANCIES, UNSPECIFIED TRIMESTER: ICD-10-CM

## 2022-07-14 DIAGNOSIS — O09.899 SUPERVISION OF OTHER HIGH RISK PREGNANCIES, UNSPECIFIED TRIMESTER: ICD-10-CM

## 2022-08-05 ENCOUNTER — NON-APPOINTMENT (OUTPATIENT)
Age: 32
End: 2022-08-05

## 2022-08-05 DIAGNOSIS — Z78.9 OTHER SPECIFIED HEALTH STATUS: ICD-10-CM

## 2022-08-05 RX ORDER — MULTI-VITAMIN/MINERAL SUPPLEMENT WITH SODIUM ASCORBATE, CHOLECALCIFEROL, DI-ALPHA-TOCOPHERYL ACETATE, THIAMINE MONONITRATE, RIBOFLAVIN, NIACINAMIDE, PYRIDOXINE HCL, FOLIC ACID, CYANOCOBALAMIN, CALCIUM FORMATE, CALCIUM CARBONATE, FERROUS (II) BIS-GLYCINATE CHELATE, POTASSIUM IODIDE, ZINC OXIDE, AND CHOLINE BITARTRATE 50; 155; 45; 32; 55; 30; 3; 1; 20; 10; 100; 10; 120; 3; 450 MG/1; MG/1; MG/1; MG/1; MG/1; [IU]/1; MG/1; MG/1; MG/1; UG/1; UG/1; MG/1; MG/1; MG/1; [IU]/1
32-1 TABLET, FILM COATED ORAL
Refills: 0 | Status: ACTIVE | COMMUNITY

## 2022-08-08 ENCOUNTER — RESULT CHARGE (OUTPATIENT)
Age: 32
End: 2022-08-08

## 2022-08-08 ENCOUNTER — APPOINTMENT (OUTPATIENT)
Dept: OBGYN | Facility: CLINIC | Age: 32
End: 2022-08-08

## 2022-08-08 VITALS
BODY MASS INDEX: 30.05 KG/M2 | WEIGHT: 187 LBS | SYSTOLIC BLOOD PRESSURE: 97 MMHG | HEIGHT: 66 IN | DIASTOLIC BLOOD PRESSURE: 59 MMHG

## 2022-08-08 LAB
BILIRUB UR QL STRIP: NORMAL
GLUCOSE UR-MCNC: NORMAL
HCG UR QL: 1 EU/DL
HGB UR QL STRIP.AUTO: NORMAL
KETONES UR-MCNC: NORMAL
LEUKOCYTE ESTERASE UR QL STRIP: NORMAL
NITRITE UR QL STRIP: NORMAL
PH UR STRIP: 6
PROT UR STRIP-MCNC: NORMAL
SP GR UR STRIP: 1.01

## 2022-08-08 PROCEDURE — 0501F PRENATAL FLOW SHEET: CPT

## 2022-08-08 PROCEDURE — 81002 URINALYSIS NONAUTO W/O SCOPE: CPT

## 2022-08-11 LAB — BACTERIA UR CULT: NORMAL

## 2022-09-01 NOTE — DISCHARGE NOTE OB - CARE PLAN
Post-Care Instructions: Patient instructed to not lie down for 4 hours and limit physical activity for 24 hours. Goal:	healthy  Assessment and plan of treatment:	No heavy lifting. Nothing inside the vagina for 6 weeks: no tampons, douching, sexual intercourse, tub baths or pools. If you have a fever over 100.4F, severe abdominal pain or heavy vaginal bleeding please call your doctor or go to the emergency room.

## 2022-09-06 ENCOUNTER — APPOINTMENT (OUTPATIENT)
Dept: OBGYN | Facility: CLINIC | Age: 32
End: 2022-09-06

## 2022-09-21 ENCOUNTER — LABORATORY RESULT (OUTPATIENT)
Age: 32
End: 2022-09-21

## 2022-09-21 ENCOUNTER — APPOINTMENT (OUTPATIENT)
Dept: OBGYN | Facility: CLINIC | Age: 32
End: 2022-09-21

## 2022-09-21 PROCEDURE — 0502F SUBSEQUENT PRENATAL CARE: CPT | Mod: NC

## 2022-09-21 PROCEDURE — 36415 COLL VENOUS BLD VENIPUNCTURE: CPT

## 2022-11-02 ENCOUNTER — APPOINTMENT (OUTPATIENT)
Dept: OBGYN | Facility: CLINIC | Age: 32
End: 2022-11-02

## 2022-11-02 DIAGNOSIS — O99.019 ANEMIA COMPLICATING PREGNANCY, UNSPECIFIED TRIMESTER: ICD-10-CM

## 2022-11-02 DIAGNOSIS — O26.843 UTERINE SIZE-DATE DISCREPANCY, THIRD TRIMESTER: ICD-10-CM

## 2022-11-02 PROCEDURE — 90715 TDAP VACCINE 7 YRS/> IM: CPT

## 2022-11-02 PROCEDURE — 76819 FETAL BIOPHYS PROFIL W/O NST: CPT | Mod: 59

## 2022-11-02 PROCEDURE — 36415 COLL VENOUS BLD VENIPUNCTURE: CPT

## 2022-11-02 PROCEDURE — 90471 IMMUNIZATION ADMIN: CPT

## 2022-11-02 PROCEDURE — 0502F SUBSEQUENT PRENATAL CARE: CPT | Mod: NC

## 2022-11-02 PROCEDURE — 76816 OB US FOLLOW-UP PER FETUS: CPT

## 2022-11-02 RX ORDER — DOCUSATE SODIUM 100 MG/1
100 CAPSULE, LIQUID FILLED ORAL TWICE DAILY
Qty: 60 | Refills: 2 | Status: ACTIVE | COMMUNITY
Start: 2022-11-02 | End: 1900-01-01

## 2022-11-02 RX ORDER — FERROUS SULFATE TAB EC 325 MG (65 MG FE EQUIVALENT) 325 (65 FE) MG
325 (65 FE) TABLET DELAYED RESPONSE ORAL 3 TIMES DAILY
Qty: 90 | Refills: 2 | Status: ACTIVE | COMMUNITY
Start: 2022-11-02 | End: 1900-01-01

## 2022-11-03 LAB
BASOPHILS # BLD AUTO: 0.02 K/UL
BASOPHILS NFR BLD AUTO: 0.2 %
EOSINOPHIL # BLD AUTO: 0.02 K/UL
EOSINOPHIL NFR BLD AUTO: 0.2 %
HCT VFR BLD CALC: 28.9 %
HCV AB SER QL: NONREACTIVE
HCV S/CO RATIO: 0.09 S/CO
HGB BLD-MCNC: 9.2 G/DL
HIV1+2 AB SPEC QL IA.RAPID: NONREACTIVE
IMM GRANULOCYTES NFR BLD AUTO: 0.5 %
LYMPHOCYTES # BLD AUTO: 1.71 K/UL
LYMPHOCYTES NFR BLD AUTO: 20.1 %
MAN DIFF?: NORMAL
MCHC RBC-ENTMCNC: 28 PG
MCHC RBC-ENTMCNC: 31.8 GM/DL
MCV RBC AUTO: 87.8 FL
MONOCYTES # BLD AUTO: 0.45 K/UL
MONOCYTES NFR BLD AUTO: 5.3 %
NEUTROPHILS # BLD AUTO: 6.26 K/UL
NEUTROPHILS NFR BLD AUTO: 73.7 %
PLATELET # BLD AUTO: 126 K/UL
RBC # BLD: 3.29 M/UL
RBC # FLD: 13.2 %
WBC # FLD AUTO: 8.5 K/UL

## 2022-11-10 ENCOUNTER — LABORATORY RESULT (OUTPATIENT)
Age: 32
End: 2022-11-10

## 2022-11-10 ENCOUNTER — APPOINTMENT (OUTPATIENT)
Dept: OBGYN | Facility: CLINIC | Age: 32
End: 2022-11-10

## 2022-11-10 ENCOUNTER — NON-APPOINTMENT (OUTPATIENT)
Age: 32
End: 2022-11-10

## 2022-11-10 PROCEDURE — 0502F SUBSEQUENT PRENATAL CARE: CPT | Mod: NC

## 2022-11-15 ENCOUNTER — APPOINTMENT (OUTPATIENT)
Dept: OBGYN | Facility: CLINIC | Age: 32
End: 2022-11-15

## 2022-11-15 ENCOUNTER — RESULT CHARGE (OUTPATIENT)
Age: 32
End: 2022-11-15

## 2022-11-15 LAB
BILIRUB UR QL STRIP: NORMAL
GLUCOSE UR-MCNC: NORMAL
HCG UR QL: 0.2 EU/DL
HGB UR QL STRIP.AUTO: NORMAL
KETONES UR-MCNC: NORMAL
LEUKOCYTE ESTERASE UR QL STRIP: NORMAL
NITRITE UR QL STRIP: NORMAL
PH UR STRIP: 7
PROT UR STRIP-MCNC: NORMAL
SP GR UR STRIP: 1.03

## 2022-11-15 PROCEDURE — 0502F SUBSEQUENT PRENATAL CARE: CPT | Mod: NC

## 2022-11-15 PROCEDURE — 81003 URINALYSIS AUTO W/O SCOPE: CPT | Mod: QW

## 2022-11-22 ENCOUNTER — TRANSCRIPTION ENCOUNTER (OUTPATIENT)
Age: 32
End: 2022-11-22

## 2022-11-22 ENCOUNTER — APPOINTMENT (OUTPATIENT)
Dept: OBGYN | Facility: CLINIC | Age: 32
End: 2022-11-22

## 2022-11-22 PROCEDURE — 36415 COLL VENOUS BLD VENIPUNCTURE: CPT

## 2022-11-22 PROCEDURE — 0502F SUBSEQUENT PRENATAL CARE: CPT | Mod: NC

## 2022-11-29 ENCOUNTER — APPOINTMENT (OUTPATIENT)
Dept: OBGYN | Facility: CLINIC | Age: 32
End: 2022-11-29

## 2022-11-29 LAB
BASOPHILS # BLD AUTO: 0.03 K/UL
BASOPHILS NFR BLD AUTO: 0.3 %
EOSINOPHIL # BLD AUTO: 0.03 K/UL
EOSINOPHIL NFR BLD AUTO: 0.3 %
HCT VFR BLD CALC: 32.1 %
HGB BLD-MCNC: 9.6 G/DL
IMM GRANULOCYTES NFR BLD AUTO: 0.8 %
LYMPHOCYTES # BLD AUTO: 1.45 K/UL
LYMPHOCYTES NFR BLD AUTO: 16.6 %
MAN DIFF?: NORMAL
MCHC RBC-ENTMCNC: 27.3 PG
MCHC RBC-ENTMCNC: 29.9 GM/DL
MCV RBC AUTO: 91.2 FL
MONOCYTES # BLD AUTO: 0.52 K/UL
MONOCYTES NFR BLD AUTO: 5.9 %
NEUTROPHILS # BLD AUTO: 6.65 K/UL
NEUTROPHILS NFR BLD AUTO: 76.1 %
PLATELET # BLD AUTO: 139 K/UL
RBC # BLD: 3.52 M/UL
RBC # FLD: 15.4 %
WBC # FLD AUTO: 8.75 K/UL

## 2022-11-29 PROCEDURE — 0502F SUBSEQUENT PRENATAL CARE: CPT | Mod: NC

## 2022-11-29 PROCEDURE — 81003 URINALYSIS AUTO W/O SCOPE: CPT | Mod: QW

## 2022-11-30 LAB
BILIRUB UR QL STRIP: NORMAL
GLUCOSE UR-MCNC: NORMAL
HCG UR QL: 0.2 EU/DL
HGB UR QL STRIP.AUTO: NORMAL
KETONES UR-MCNC: NORMAL
LEUKOCYTE ESTERASE UR QL STRIP: NORMAL
NITRITE UR QL STRIP: NORMAL
PH UR STRIP: 7
PROT UR STRIP-MCNC: NORMAL
SP GR UR STRIP: 1.02

## 2022-12-06 ENCOUNTER — APPOINTMENT (OUTPATIENT)
Dept: OBGYN | Facility: CLINIC | Age: 32
End: 2022-12-06

## 2022-12-06 VITALS — WEIGHT: 212 LBS | SYSTOLIC BLOOD PRESSURE: 109 MMHG | DIASTOLIC BLOOD PRESSURE: 68 MMHG | BODY MASS INDEX: 34.22 KG/M2

## 2022-12-06 PROCEDURE — 76819 FETAL BIOPHYS PROFIL W/O NST: CPT

## 2022-12-06 PROCEDURE — 0502F SUBSEQUENT PRENATAL CARE: CPT | Mod: NC

## 2022-12-06 PROCEDURE — 81003 URINALYSIS AUTO W/O SCOPE: CPT | Mod: QW

## 2022-12-08 ENCOUNTER — APPOINTMENT (OUTPATIENT)
Dept: OBGYN | Facility: CLINIC | Age: 32
End: 2022-12-08

## 2022-12-08 PROCEDURE — 0502F SUBSEQUENT PRENATAL CARE: CPT | Mod: NC

## 2022-12-08 PROCEDURE — 76818 FETAL BIOPHYS PROFILE W/NST: CPT

## 2022-12-12 ENCOUNTER — NON-APPOINTMENT (OUTPATIENT)
Age: 32
End: 2022-12-12

## 2022-12-12 ENCOUNTER — APPOINTMENT (OUTPATIENT)
Dept: OBGYN | Facility: CLINIC | Age: 32
End: 2022-12-12

## 2022-12-12 VITALS — HEIGHT: 66 IN | SYSTOLIC BLOOD PRESSURE: 118 MMHG | DIASTOLIC BLOOD PRESSURE: 74 MMHG

## 2022-12-12 VITALS — DIASTOLIC BLOOD PRESSURE: 74 MMHG | WEIGHT: 213 LBS | SYSTOLIC BLOOD PRESSURE: 119 MMHG | BODY MASS INDEX: 34.38 KG/M2

## 2022-12-12 LAB
BILIRUB UR QL STRIP: NORMAL
GLUCOSE UR-MCNC: NORMAL
HCG UR QL: 0.2 EU/DL
HGB UR QL STRIP.AUTO: NORMAL
KETONES UR-MCNC: NORMAL
LEUKOCYTE ESTERASE UR QL STRIP: NORMAL
NITRITE UR QL STRIP: NORMAL
PH UR STRIP: 6.5
PROT UR STRIP-MCNC: 30
SP GR UR STRIP: 1.02

## 2022-12-12 PROCEDURE — 76818 FETAL BIOPHYS PROFILE W/NST: CPT

## 2022-12-12 PROCEDURE — 81003 URINALYSIS AUTO W/O SCOPE: CPT | Mod: QW

## 2022-12-12 PROCEDURE — 0502F SUBSEQUENT PRENATAL CARE: CPT | Mod: NC

## 2022-12-14 ENCOUNTER — APPOINTMENT (OUTPATIENT)
Dept: OBGYN | Facility: CLINIC | Age: 32
End: 2022-12-14

## 2022-12-14 VITALS — SYSTOLIC BLOOD PRESSURE: 101 MMHG | DIASTOLIC BLOOD PRESSURE: 64 MMHG | WEIGHT: 214 LBS | BODY MASS INDEX: 34.54 KG/M2

## 2022-12-14 LAB
BILIRUB UR QL STRIP: NORMAL
GLUCOSE UR-MCNC: NORMAL
HCG UR QL: 0.2 EU/DL
HGB UR QL STRIP.AUTO: NORMAL
KETONES UR-MCNC: NORMAL
LEUKOCYTE ESTERASE UR QL STRIP: NORMAL
NITRITE UR QL STRIP: NORMAL
PH UR STRIP: 7
PROT UR STRIP-MCNC: 30
SP GR UR STRIP: 1.02

## 2022-12-14 PROCEDURE — 81003 URINALYSIS AUTO W/O SCOPE: CPT | Mod: QW

## 2022-12-14 PROCEDURE — 76815 OB US LIMITED FETUS(S): CPT

## 2022-12-14 PROCEDURE — 59025 FETAL NON-STRESS TEST: CPT | Mod: 59

## 2022-12-14 PROCEDURE — 0502F SUBSEQUENT PRENATAL CARE: CPT | Mod: NC

## 2022-12-16 ENCOUNTER — APPOINTMENT (OUTPATIENT)
Dept: OBGYN | Facility: CLINIC | Age: 32
End: 2022-12-16

## 2022-12-16 LAB
BILIRUB UR QL STRIP: NORMAL
GLUCOSE UR-MCNC: NORMAL
HCG UR QL: 0.2 EU/DL
HGB UR QL STRIP.AUTO: NORMAL
KETONES UR-MCNC: 40
LEUKOCYTE ESTERASE UR QL STRIP: NORMAL
NITRITE UR QL STRIP: NORMAL
PH UR STRIP: 6.5
PROT UR STRIP-MCNC: 30
SP GR UR STRIP: 1.02

## 2022-12-16 PROCEDURE — 81003 URINALYSIS AUTO W/O SCOPE: CPT | Mod: QW

## 2022-12-16 PROCEDURE — 76818 FETAL BIOPHYS PROFILE W/NST: CPT

## 2022-12-16 PROCEDURE — 0502F SUBSEQUENT PRENATAL CARE: CPT | Mod: NC

## 2022-12-19 ENCOUNTER — NON-APPOINTMENT (OUTPATIENT)
Age: 32
End: 2022-12-19

## 2022-12-19 ENCOUNTER — APPOINTMENT (OUTPATIENT)
Dept: OBGYN | Facility: CLINIC | Age: 32
End: 2022-12-19
Payer: MEDICAID

## 2022-12-19 VITALS
HEIGHT: 66 IN | WEIGHT: 216.4 LBS | SYSTOLIC BLOOD PRESSURE: 114 MMHG | BODY MASS INDEX: 34.78 KG/M2 | DIASTOLIC BLOOD PRESSURE: 67 MMHG

## 2022-12-19 LAB
BILIRUB UR QL STRIP: NORMAL
GLUCOSE UR-MCNC: NORMAL
HCG UR QL: 0.2 EU/DL
HGB UR QL STRIP.AUTO: NORMAL
KETONES UR-MCNC: NORMAL
LEUKOCYTE ESTERASE UR QL STRIP: NORMAL
NITRITE UR QL STRIP: NORMAL
PH UR STRIP: 7
PROT UR STRIP-MCNC: NORMAL
SP GR UR STRIP: 1

## 2022-12-19 PROCEDURE — 0502F SUBSEQUENT PRENATAL CARE: CPT | Mod: NC

## 2022-12-19 PROCEDURE — 81003 URINALYSIS AUTO W/O SCOPE: CPT | Mod: QW

## 2022-12-20 ENCOUNTER — APPOINTMENT (OUTPATIENT)
Dept: OBGYN | Facility: CLINIC | Age: 32
End: 2022-12-20

## 2022-12-20 VITALS — WEIGHT: 217 LBS | BODY MASS INDEX: 35.02 KG/M2 | DIASTOLIC BLOOD PRESSURE: 73 MMHG | SYSTOLIC BLOOD PRESSURE: 127 MMHG

## 2022-12-20 LAB
BILIRUB UR QL STRIP: NORMAL
GLUCOSE UR-MCNC: NORMAL
HCG UR QL: 2 EU/DL
HGB UR QL STRIP.AUTO: NORMAL
KETONES UR-MCNC: NORMAL
LEUKOCYTE ESTERASE UR QL STRIP: NORMAL
NITRITE UR QL STRIP: NORMAL
PH UR STRIP: 7
PROT UR STRIP-MCNC: NORMAL
SP GR UR STRIP: 1.02

## 2022-12-20 PROCEDURE — 81003 URINALYSIS AUTO W/O SCOPE: CPT | Mod: QW

## 2022-12-20 PROCEDURE — 76816 OB US FOLLOW-UP PER FETUS: CPT

## 2022-12-20 PROCEDURE — 0502F SUBSEQUENT PRENATAL CARE: CPT | Mod: NC

## 2022-12-21 ENCOUNTER — NON-APPOINTMENT (OUTPATIENT)
Age: 32
End: 2022-12-21

## 2022-12-22 ENCOUNTER — INPATIENT (INPATIENT)
Facility: HOSPITAL | Age: 32
LOS: 1 days | Discharge: HOME | End: 2022-12-24
Attending: SPECIALIST | Admitting: SPECIALIST

## 2022-12-22 ENCOUNTER — NON-APPOINTMENT (OUTPATIENT)
Age: 32
End: 2022-12-22

## 2022-12-22 ENCOUNTER — APPOINTMENT (OUTPATIENT)
Dept: OBGYN | Facility: CLINIC | Age: 32
End: 2022-12-22

## 2022-12-22 VITALS — DIASTOLIC BLOOD PRESSURE: 75 MMHG | WEIGHT: 215 LBS | SYSTOLIC BLOOD PRESSURE: 104 MMHG | BODY MASS INDEX: 34.7 KG/M2

## 2022-12-22 VITALS — SYSTOLIC BLOOD PRESSURE: 136 MMHG | HEART RATE: 99 BPM | DIASTOLIC BLOOD PRESSURE: 83 MMHG

## 2022-12-22 LAB
BASOPHILS # BLD AUTO: 0.02 K/UL — SIGNIFICANT CHANGE UP (ref 0–0.2)
BASOPHILS NFR BLD AUTO: 0.2 % — SIGNIFICANT CHANGE UP (ref 0–1)
BILIRUB UR QL STRIP: NORMAL
BLD GP AB SCN SERPL QL: SIGNIFICANT CHANGE UP
EOSINOPHIL # BLD AUTO: 0.03 K/UL — SIGNIFICANT CHANGE UP (ref 0–0.7)
EOSINOPHIL NFR BLD AUTO: 0.3 % — SIGNIFICANT CHANGE UP (ref 0–8)
GLUCOSE UR-MCNC: NORMAL
HCG UR QL: 0.2 EU/DL
HCT VFR BLD CALC: 32.4 % — LOW (ref 37–47)
HGB BLD-MCNC: 10.6 G/DL — LOW (ref 12–16)
HGB UR QL STRIP.AUTO: NORMAL
IMM GRANULOCYTES NFR BLD AUTO: 0.7 % — HIGH (ref 0.1–0.3)
KETONES UR-MCNC: NORMAL
LEUKOCYTE ESTERASE UR QL STRIP: NORMAL
LYMPHOCYTES # BLD AUTO: 1.48 K/UL — SIGNIFICANT CHANGE UP (ref 1.2–3.4)
LYMPHOCYTES # BLD AUTO: 17 % — LOW (ref 20.5–51.1)
MCHC RBC-ENTMCNC: 27.1 PG — SIGNIFICANT CHANGE UP (ref 27–31)
MCHC RBC-ENTMCNC: 32.7 G/DL — SIGNIFICANT CHANGE UP (ref 32–37)
MCV RBC AUTO: 82.9 FL — SIGNIFICANT CHANGE UP (ref 81–99)
MONOCYTES # BLD AUTO: 0.51 K/UL — SIGNIFICANT CHANGE UP (ref 0.1–0.6)
MONOCYTES NFR BLD AUTO: 5.9 % — SIGNIFICANT CHANGE UP (ref 1.7–9.3)
NEUTROPHILS # BLD AUTO: 6.61 K/UL — HIGH (ref 1.4–6.5)
NEUTROPHILS NFR BLD AUTO: 75.9 % — HIGH (ref 42.2–75.2)
NITRITE UR QL STRIP: NORMAL
NRBC # BLD: 0 /100 WBCS — SIGNIFICANT CHANGE UP (ref 0–0)
PH UR STRIP: 6
PLATELET # BLD AUTO: 157 K/UL — SIGNIFICANT CHANGE UP (ref 130–400)
PRENATAL SYPHILIS TEST: SIGNIFICANT CHANGE UP
PROT UR STRIP-MCNC: NORMAL
RBC # BLD: 3.91 M/UL — LOW (ref 4.2–5.4)
RBC # FLD: 16.4 % — HIGH (ref 11.5–14.5)
SARS-COV-2 RNA SPEC QL NAA+PROBE: SIGNIFICANT CHANGE UP
SP GR UR STRIP: 1.02
WBC # BLD: 8.71 K/UL — SIGNIFICANT CHANGE UP (ref 4.8–10.8)
WBC # FLD AUTO: 8.71 K/UL — SIGNIFICANT CHANGE UP (ref 4.8–10.8)

## 2022-12-22 PROCEDURE — 76816 OB US FOLLOW-UP PER FETUS: CPT | Mod: 59

## 2022-12-22 PROCEDURE — 81003 URINALYSIS AUTO W/O SCOPE: CPT | Mod: QW

## 2022-12-22 PROCEDURE — 0502F SUBSEQUENT PRENATAL CARE: CPT | Mod: NC

## 2022-12-22 PROCEDURE — 76818 FETAL BIOPHYS PROFILE W/NST: CPT

## 2022-12-22 RX ORDER — OXYTOCIN 10 UNIT/ML
333.33 VIAL (ML) INJECTION
Qty: 20 | Refills: 0 | Status: DISCONTINUED | OUTPATIENT
Start: 2022-12-22 | End: 2022-12-23

## 2022-12-22 RX ORDER — SODIUM CHLORIDE 9 MG/ML
1000 INJECTION, SOLUTION INTRAVENOUS
Refills: 0 | Status: DISCONTINUED | OUTPATIENT
Start: 2022-12-22 | End: 2022-12-23

## 2022-12-22 RX ORDER — AMPICILLIN TRIHYDRATE 250 MG
2 CAPSULE ORAL ONCE
Refills: 0 | Status: COMPLETED | OUTPATIENT
Start: 2022-12-22 | End: 2022-12-22

## 2022-12-22 RX ORDER — CHLORHEXIDINE GLUCONATE 213 G/1000ML
1 SOLUTION TOPICAL ONCE
Refills: 0 | Status: DISCONTINUED | OUTPATIENT
Start: 2022-12-22 | End: 2022-12-23

## 2022-12-22 RX ORDER — AMPICILLIN TRIHYDRATE 250 MG
1 CAPSULE ORAL EVERY 4 HOURS
Refills: 0 | Status: DISCONTINUED | OUTPATIENT
Start: 2022-12-22 | End: 2022-12-23

## 2022-12-22 RX ORDER — OXYTOCIN 10 UNIT/ML
2 VIAL (ML) INJECTION
Qty: 30 | Refills: 0 | Status: DISCONTINUED | OUTPATIENT
Start: 2022-12-22 | End: 2022-12-23

## 2022-12-22 RX ADMIN — Medication 200 GRAM(S): at 19:50

## 2022-12-22 RX ADMIN — Medication 2 MILLIUNIT(S)/MIN: at 19:49

## 2022-12-22 RX ADMIN — SODIUM CHLORIDE 125 MILLILITER(S): 9 INJECTION, SOLUTION INTRAVENOUS at 19:49

## 2022-12-22 NOTE — OB PROVIDER H&P - NSHPPHYSICALEXAM_GEN_ALL_CORE
Vital Signs Last 24 Hrs  HR: 91 (22 Dec 2022 16:26) (91 - 99)  BP: 109/63 (22 Dec 2022 16:26) (109/63 - 136/83)  RR: 22 (22 Dec 2022 16:20) (22 - 22)    Physical Exam  Gen: AAOx3, NAD  Abd: soft, gravid, nontender, nondistended, no palpable contractions  Ext: no edema or erythema in bilateral upper and lower extremities  SVE: 2/70/-2 by Dr. Larsen    EFM: 130/mod/+accels  [place]: irregular

## 2022-12-22 NOTE — OB PROVIDER H&P - ASSESSMENT
31yo  at 42w1d GA, GBS neg, with fetal proportions concerning for possible shoulder dystocia    - cont EFM/toco  - MFM recommendations  - social work consult    Dr. Larsen aware    ADDENDUM:  After further discussion with  and M attending, , patient amenable to admission with induction and understands possibility of  section. Refer to M note.    - admit to L&D  - clear liquid diet  - IVF hydration  - continuous ECM/toco  - monitor vitals  - pain management prn  - f/u admission labs and covid swab     aware 33yo  at 42w1d GA, GBS unknown with h/o GBS in prior pregnancy, with fetal proportions concerning for possible shoulder dystocia    - cont EFM/toco  - MFM recommendations  - social work consult    Dr. Larsen aware    ADDENDUM:  After further discussion with  and M attending, , patient amenable to admission with induction and understands possibility of  section. Refer to M note.    - admit to L&D  - clear liquid diet  - IVF hydration  - continuous ECM/toco  - monitor vitals  - pain management prn  - f/u admission labs and covid swab  - GBS prophylaxis in last pregnancy     aware 31yo  at 42w1d GA, GBS unknown with h/o GBS in prior pregnancy, with fetal proportions concerning for possible shoulder dystocia    - cont EFM/toco  - MFM recommendations  - social work consult    Dr. Larsen aware    ADDENDUM:  After further discussion with  and M attending, , patient amenable to admission with induction and understands possibility of shoulder dystocia or  section.  Refer to M note.    - admit to L&D  - clear liquid diet  - IVF hydration  - continuous ECM/toco  - monitor vitals  - pain management prn  - f/u admission labs and covid swab  - GBS prophylaxis with ampicillin      aware

## 2022-12-22 NOTE — OB PROVIDER TRIAGE NOTE - NS_OBGYNHISTORY_OBGYN_ALL_OB_FT
ObHx:   h/o 3 FT , largest 9-10    GynHx: patient denies h/o fibroids, ovarian cysts, abnormal PAPs, STIs

## 2022-12-22 NOTE — OB PROVIDER H&P - HISTORY OF PRESENT ILLNESS
33yo  at 42w1d GA (ALEX: 22, by first trimester sono) presents to labor and delivery. She endorses contractions increasing in frequency and duration. Denies vaginal bleeding and leakage of fluids. Endorses good fetal movement. She was scheduled for an induction of labor, but did not show for it due to desire to labor naturally. EFW today is 4430 with AC >5cm greater than HC. PMD has concern for possible shoulder dystocia, and recommended primary  delivery, which does not align with the patient's wishes. Per the patient's , she tried to exit a moving car on the way here because she did not want to come. GBS neg.

## 2022-12-22 NOTE — OB RN PATIENT PROFILE - PRO PRENATAL LABS ORI SOURCE HIV
Oxybutynin Counseling:  I discussed with the patient the risks of oxybutynin including but not limited to skin rash, drowsiness, dry mouth, difficulty urinating, and blurred vision. SCM

## 2022-12-22 NOTE — OB PROVIDER TRIAGE NOTE - HISTORY OF PRESENT ILLNESS
31yo  at 42w1d GA (ALEX: 22, by first trimester sono) presents to labor and delivery. She endorses contractions increasing in frequency and duration. Denies vaginal bleeding and leakage of fluids. Endorses good fetal movement. She was scheduled for an induction of labor, but did not show for it due to desire to labor naturally. EFW today is 4430 with AC >5cm greater than HC. PMD has concern for possible shoulder dystocia, and recommended primary  delivery, which does not align with the patient's wishes. Per the patient's , she tried to exit a moving car on the way here because she did not want to come. GBS neg.

## 2022-12-22 NOTE — OB PROVIDER TRIAGE NOTE - NSHPPHYSICALEXAM_GEN_ALL_CORE
Vital Signs Last 24 Hrs  HR: 91 (22 Dec 2022 16:26) (91 - 99)  BP: 109/63 (22 Dec 2022 16:26) (109/63 - 136/83)  RR: 22 (22 Dec 2022 16:20) (22 - 22)    Physical Exam  Gen: AAOx3, NAD  Abd: soft, gravid, nontender, nondistended, no palpable contractions  Ext: no edema or erythema in bilateral upper and lower extremities  SVE: 2/70/-2 by Dr. Larsen    EFM: 130/mod/+accels  [place]: irregular

## 2022-12-22 NOTE — OB PROVIDER TRIAGE NOTE - NSHPLABSRESULTS_GEN_ALL_CORE
6/30/22  Measles immune  Mumsps immune  Rubella immune  Varicella immune   RPR NR  B positive, antibody neg  HIV NR  HepBsAg NR    9/21   GCT 63    11/10  GBS neg    11/22  h/h 9.6/32.1    11/10/22  GBS neg    6/30/22  measles immune  mumps immune  rubella immune  varicella immune  CT/GC neg  RPR NR  B pos, no antibodies detected  UCx coag neg staph  HIV NR  HepBSAg NR    US  42w1d: EFW 4430gm, HC 33.5cm, AC 38.91cm, MVP 10.44cm

## 2022-12-22 NOTE — OB PROVIDER TRIAGE NOTE - CURRENT PREGNANCY COMPLICATIONS, OB PROFILE
Telephone Encounter by Joy Montoya MD at 09/13/17 10:06 AM     Author:  Joy Montoya MD Service:  (none) Author Type:  Physician     Filed:  09/13/17 10:06 AM Encounter Date:  9/13/2017 Status:  Signed     :  Joy Montoya MD (Physician)            Noted[HA1.1M]      Revision History        User Key Date/Time User Provider Type Action    > HA1.1 09/13/17 10:06 AM Joy Montoya MD Physician Sign    M - Manual             Abnormal Amniotic Fluid Volume

## 2022-12-22 NOTE — OB PROVIDER TRIAGE NOTE - NSOBPROVIDERNOTE_OBGYN_ALL_OB_FT
A/P: 33yo  at 42w1d GA, GBS neg, with fetal proportions concerning for possible shoulder dystocia    - cont EFM/toco  - MFM recommendations  - social work consult    Dr. Champagne and Dr. Larsen aware

## 2022-12-22 NOTE — OB PROVIDER IHI INDUCTION/AUGMENTATION NOTE - NSCHECKLIST_OBGYN_ALL_OB_CAL
Assumed care at 0700. No c/o pain or discomfort. Asleep but easily arousable. A/O x4. Reviewed plan of care. Call light within reach. Bed in low and locked position. Bed alarm on. Monitor for signs of Sz activity. Will continue to monitor.   5

## 2022-12-22 NOTE — OB RN PATIENT PROFILE - FLU SEASON?
Patient instructions given regarding labs, x-rays, medications, referral and followup appointment.  
Yes...

## 2022-12-22 NOTE — OB RN TRIAGE NOTE - FALL HARM RISK - UNIVERSAL INTERVENTIONS
Bed in lowest position, wheels locked, appropriate side rails in place/Call bell, personal items and telephone in reach/Instruct patient to call for assistance before getting out of bed or chair/Non-slip footwear when patient is out of bed/[place] to call system/Physically safe environment - no spills, clutter or unnecessary equipment/Purposeful Proactive Rounding/Room/bathroom lighting operational, light cord in reach

## 2022-12-22 NOTE — CONSULT NOTE ADULT - SUBJECTIVE AND OBJECTIVE BOX
22  M Att'g Consult Note:    [id number] at 42w1d GA (ALEX: 22, by unsure dates) came to L&D this afternoon for fetal testing.  Pt states she had no US until late 2nd trimester, and no anatomic survey, or genetic screening.  She was previously scheduled for IOL for large for gestational age fetus and likely 41w EGA, however she later declined IOL.  This evening she feels well, but stressed, and reports active fetal movements and increasing [place] contractions.  She denies vaginal bleeding and LOF.  EFW today at the office was 4430 with AC >5cm greater than HC.   Providers have expressed concern for increased risk of shoulder dystocia, and earlier recommended primary  delivery.  PMHx: Denies DM, chronic Htn, asthma.   Surg: None  Meds: PNV  Allergies: NKDA  OBHx:  x3 between  and .  No complications.             Largest birthweight = 9#10oz, 4366g at [facility].  GynHx: LMP uncertain.  Pt dates pregnancy by "h Date" of 3/14, but does not know LMP.   Vaccinations: Not discussed.   Family Planning: Not discussed.   ROS: As above.     Px: Stressed, but alert and oriented x3.  Well informed and appears to have good understanding of information provided.   HEENT: NC/AT.    Chest, Cor: Not examined.   Abdomen: Fundus soft, NT.  [place] Vtx.  Fundal Height 39cm.  Clin EFW 4200g by my exam.   Extr: No CCE.   SVE per admission note: 2cm/70%/Vtx at -2 sta.   NST: Baseline: 145, mod vblty, accels to 175, no decels, Reactive, Cat 1.  [place]: Contractions, not painful, q 3-6cm.   LAB:  Bpos/AntibNeg; HbEP not found.  RPR/HIV/HBSAg all NR.           GCT 63;  GBS neg.   US: SFVTx, MVP 10.3, EFW 4430g per Dr Larsen.     Impression/Recommendation: 33yo  with pelvis tested to 4430g presents with suspicion of post dates and large for gestational age fetus.   1. Dating: Appears to be uncertain.  By pt "veh Dating" she could be >43w, however by best estimate during prenatal care she is 42w1d.                 I would estimate between 41 and 43w ega.   2. LGA fetus:  At this GA, we know that EFW by US and clinical exam has a wide margin of error.  Nevertheless with fundal height of 39, EFW is clearly < 4500g.   3. Pt desire for :  My impression is that Ms Carrizales is well-informed and appreciates the risks and benefits of induction of labor vs Elective 1o  delivery.  I personally reviewed with her the unanimous concerns of all of her providers, including myself, re: risks of vaginal delivery which include:  - Shoulder dystocia that could result in permanent  injury and injury to maternal pelvis.    - Other injury to fetus.   - Complications of post-dates including fetal intolerance of labor and fetal distress.   4. Delivery Plan: Will be determined by Ms Carrizales with Dr Larsen.                            Like her primary providers, I also would offer elective CSx, however pt has made clear she does not want this, and prefers to give labor a chance.                            This preference does not seem totally unreasonable to my assessment.     Please call me if I can be of further assistance.  MD Rodolfo, FACOG

## 2022-12-23 LAB
BASOPHILS # BLD AUTO: 0.04 K/UL — SIGNIFICANT CHANGE UP (ref 0–0.2)
BASOPHILS NFR BLD AUTO: 0.3 % — SIGNIFICANT CHANGE UP (ref 0–1)
COVID-19 SPIKE DOMAIN AB INTERP: POSITIVE
COVID-19 SPIKE DOMAIN ANTIBODY RESULT: >250 U/ML — HIGH
EOSINOPHIL # BLD AUTO: 0.04 K/UL — SIGNIFICANT CHANGE UP (ref 0–0.7)
EOSINOPHIL NFR BLD AUTO: 0.3 % — SIGNIFICANT CHANGE UP (ref 0–8)
HCT VFR BLD CALC: 33.7 % — LOW (ref 37–47)
HGB BLD-MCNC: 11 G/DL — LOW (ref 12–16)
IMM GRANULOCYTES NFR BLD AUTO: 0.8 % — HIGH (ref 0.1–0.3)
LYMPHOCYTES # BLD AUTO: 1.74 K/UL — SIGNIFICANT CHANGE UP (ref 1.2–3.4)
LYMPHOCYTES # BLD AUTO: 14.7 % — LOW (ref 20.5–51.1)
MCHC RBC-ENTMCNC: 27.6 PG — SIGNIFICANT CHANGE UP (ref 27–31)
MCHC RBC-ENTMCNC: 32.6 G/DL — SIGNIFICANT CHANGE UP (ref 32–37)
MCV RBC AUTO: 84.5 FL — SIGNIFICANT CHANGE UP (ref 81–99)
MONOCYTES # BLD AUTO: 0.86 K/UL — HIGH (ref 0.1–0.6)
MONOCYTES NFR BLD AUTO: 7.2 % — SIGNIFICANT CHANGE UP (ref 1.7–9.3)
NEUTROPHILS # BLD AUTO: 9.1 K/UL — HIGH (ref 1.4–6.5)
NEUTROPHILS NFR BLD AUTO: 76.7 % — HIGH (ref 42.2–75.2)
NRBC # BLD: 0 /100 WBCS — SIGNIFICANT CHANGE UP (ref 0–0)
PLATELET # BLD AUTO: 164 K/UL — SIGNIFICANT CHANGE UP (ref 130–400)
RBC # BLD: 3.99 M/UL — LOW (ref 4.2–5.4)
RBC # FLD: 16.1 % — HIGH (ref 11.5–14.5)
SARS-COV-2 IGG+IGM SERPL QL IA: >250 U/ML — HIGH
SARS-COV-2 IGG+IGM SERPL QL IA: POSITIVE
WBC # BLD: 11.87 K/UL — HIGH (ref 4.8–10.8)
WBC # FLD AUTO: 11.87 K/UL — HIGH (ref 4.8–10.8)

## 2022-12-23 PROCEDURE — 59400 OBSTETRICAL CARE: CPT | Mod: U9

## 2022-12-23 RX ORDER — HYDROCORTISONE 1 %
1 OINTMENT (GRAM) TOPICAL EVERY 6 HOURS
Refills: 0 | Status: DISCONTINUED | OUTPATIENT
Start: 2022-12-23 | End: 2022-12-24

## 2022-12-23 RX ORDER — PRAMOXINE HYDROCHLORIDE 150 MG/15G
1 AEROSOL, FOAM RECTAL EVERY 4 HOURS
Refills: 0 | Status: DISCONTINUED | OUTPATIENT
Start: 2022-12-23 | End: 2022-12-24

## 2022-12-23 RX ORDER — LANOLIN
1 OINTMENT (GRAM) TOPICAL EVERY 6 HOURS
Refills: 0 | Status: DISCONTINUED | OUTPATIENT
Start: 2022-12-23 | End: 2022-12-24

## 2022-12-23 RX ORDER — IBUPROFEN 200 MG
600 TABLET ORAL EVERY 6 HOURS
Refills: 0 | Status: COMPLETED | OUTPATIENT
Start: 2022-12-23 | End: 2023-11-21

## 2022-12-23 RX ORDER — DIBUCAINE 1 %
1 OINTMENT (GRAM) RECTAL EVERY 6 HOURS
Refills: 0 | Status: DISCONTINUED | OUTPATIENT
Start: 2022-12-23 | End: 2022-12-24

## 2022-12-23 RX ORDER — OXYTOCIN 10 UNIT/ML
41.67 VIAL (ML) INJECTION
Qty: 20 | Refills: 0 | Status: DISCONTINUED | OUTPATIENT
Start: 2022-12-23 | End: 2022-12-24

## 2022-12-23 RX ORDER — BENZOCAINE 10 %
1 GEL (GRAM) MUCOUS MEMBRANE EVERY 6 HOURS
Refills: 0 | Status: DISCONTINUED | OUTPATIENT
Start: 2022-12-23 | End: 2022-12-24

## 2022-12-23 RX ORDER — MAGNESIUM HYDROXIDE 400 MG/1
30 TABLET, CHEWABLE ORAL
Refills: 0 | Status: DISCONTINUED | OUTPATIENT
Start: 2022-12-23 | End: 2022-12-24

## 2022-12-23 RX ORDER — IBUPROFEN 200 MG
600 TABLET ORAL EVERY 6 HOURS
Refills: 0 | Status: DISCONTINUED | OUTPATIENT
Start: 2022-12-23 | End: 2022-12-24

## 2022-12-23 RX ORDER — OXYCODONE HYDROCHLORIDE 5 MG/1
5 TABLET ORAL
Refills: 0 | Status: DISCONTINUED | OUTPATIENT
Start: 2022-12-23 | End: 2022-12-24

## 2022-12-23 RX ORDER — SIMETHICONE 80 MG/1
80 TABLET, CHEWABLE ORAL EVERY 4 HOURS
Refills: 0 | Status: DISCONTINUED | OUTPATIENT
Start: 2022-12-23 | End: 2022-12-24

## 2022-12-23 RX ORDER — AER TRAVELER 0.5 G/1
1 SOLUTION RECTAL; TOPICAL EVERY 4 HOURS
Refills: 0 | Status: DISCONTINUED | OUTPATIENT
Start: 2022-12-23 | End: 2022-12-24

## 2022-12-23 RX ORDER — ACETAMINOPHEN 500 MG
975 TABLET ORAL
Refills: 0 | Status: DISCONTINUED | OUTPATIENT
Start: 2022-12-23 | End: 2022-12-24

## 2022-12-23 RX ORDER — DIPHENHYDRAMINE HCL 50 MG
25 CAPSULE ORAL EVERY 6 HOURS
Refills: 0 | Status: DISCONTINUED | OUTPATIENT
Start: 2022-12-23 | End: 2022-12-24

## 2022-12-23 RX ORDER — OXYCODONE HYDROCHLORIDE 5 MG/1
5 TABLET ORAL ONCE
Refills: 0 | Status: DISCONTINUED | OUTPATIENT
Start: 2022-12-23 | End: 2022-12-24

## 2022-12-23 RX ORDER — KETOROLAC TROMETHAMINE 30 MG/ML
30 SYRINGE (ML) INJECTION ONCE
Refills: 0 | Status: DISCONTINUED | OUTPATIENT
Start: 2022-12-23 | End: 2022-12-23

## 2022-12-23 RX ORDER — TETANUS TOXOID, REDUCED DIPHTHERIA TOXOID AND ACELLULAR PERTUSSIS VACCINE, ADSORBED 5; 2.5; 8; 8; 2.5 [IU]/.5ML; [IU]/.5ML; UG/.5ML; UG/.5ML; UG/.5ML
0.5 SUSPENSION INTRAMUSCULAR ONCE
Refills: 0 | Status: DISCONTINUED | OUTPATIENT
Start: 2022-12-23 | End: 2022-12-24

## 2022-12-23 RX ORDER — SODIUM CHLORIDE 9 MG/ML
3 INJECTION INTRAMUSCULAR; INTRAVENOUS; SUBCUTANEOUS EVERY 8 HOURS
Refills: 0 | Status: DISCONTINUED | OUTPATIENT
Start: 2022-12-23 | End: 2022-12-24

## 2022-12-23 RX ADMIN — Medication 600 MILLIGRAM(S): at 17:52

## 2022-12-23 RX ADMIN — Medication 1 TABLET(S): at 14:24

## 2022-12-23 RX ADMIN — Medication 975 MILLIGRAM(S): at 14:54

## 2022-12-23 RX ADMIN — Medication 600 MILLIGRAM(S): at 23:06

## 2022-12-23 RX ADMIN — Medication 600 MILLIGRAM(S): at 18:22

## 2022-12-23 RX ADMIN — Medication 975 MILLIGRAM(S): at 14:24

## 2022-12-23 RX ADMIN — SODIUM CHLORIDE 3 MILLILITER(S): 9 INJECTION INTRAMUSCULAR; INTRAVENOUS; SUBCUTANEOUS at 14:00

## 2022-12-23 RX ADMIN — SODIUM CHLORIDE 125 MILLILITER(S): 9 INJECTION, SOLUTION INTRAVENOUS at 00:06

## 2022-12-23 RX ADMIN — Medication 108 GRAM(S): at 04:06

## 2022-12-23 RX ADMIN — Medication 108 GRAM(S): at 00:06

## 2022-12-23 NOTE — PROCEDURE NOTE - ADDITIONAL PROCEDURE DETAILS
Post Labor  Epidural/ Delivery  Evaluation Note:    Uncomplicated anesthetic for Vaginal Delivery.    Patient seen at bedside. Epidural to be removed by RN before patient transfer.  Patient moving B/L lower extremities.  Motor block appropriate and resolving. Vital Signs are stable. Pain well controlled.     Celestino Score greater than 9    Mental Status:  __x__ Awake   ___x__ Alert   _____ Drowsy   _____ Sedated    Nausea/Vomiting:  __x__ NO  ______Yes,   See Post - Op Orders          Pain Scale (0-10):  _____    Treatment: __X__ None       Plan: Discharge:   ____Home       __X___Floor     _____Critical Care    _____

## 2022-12-23 NOTE — PROGRESS NOTE ADULT - ASSESSMENT
A/P: 31yo  at 42w2d, GBS unknown with h/o GBS in prior pregnancy, with fetal macrosomia, for IOL for post-EDC, s/p epidural.    -cont EFM/toco  -clear liquid diet, IVF  -pain management with epidural  -cont pitocin for IOL    Dr. Larsen aware and at bedside.

## 2022-12-23 NOTE — OB RN DELIVERY SUMMARY - NSSELHIDDEN_OBGYN_ALL_OB_FT
[NS_DeliveryAttending1_OBGYN_ALL_OB_FT:[email or web address]=],[NS_DeliveryRN_OBGYN_ALL_OB_FT:[id number]=]

## 2022-12-23 NOTE — OB PROVIDER DELIVERY SUMMARY - NSSELHIDDEN_OBGYN_ALL_OB_FT
[NS_DeliveryAttending1_OBGYN_ALL_OB_FT:[email or web address]=],[NS_DeliveryRN_OBGYN_ALL_OB_FT:[id number]=]

## 2022-12-23 NOTE — OB PROVIDER DELIVERY SUMMARY - NSPROVIDERDELIVERYNOTE_OBGYN_ALL_OB_FT
Patient was fully dilated and pushing. Fetal head was OA and restituted to ROT. The anterior and posterior shoulders delivered, followed by the remaining body atraumatically. The  was handed to the mother and RN.  Delayed cord clamping was performed, and then clamped and cut. Cord blood gases collected x2. The placenta delivered intact with membranes. Pitocin was administered. Uterus massaged, fundus found to be firm. Cervix, vagina and perineum inspected and a first degree perineal laceration was noted, repaired using 2-0 chromic in the usual fashion with good hemostasis.     Viable female infant delivered, weighing 4495 gram , with APGARs 9/9    Laceration: first degree       Dr. Larsen present for the delivery

## 2022-12-24 ENCOUNTER — TRANSCRIPTION ENCOUNTER (OUTPATIENT)
Age: 32
End: 2022-12-24

## 2022-12-24 VITALS
TEMPERATURE: 96 F | RESPIRATION RATE: 18 BRPM | HEART RATE: 75 BPM | SYSTOLIC BLOOD PRESSURE: 113 MMHG | DIASTOLIC BLOOD PRESSURE: 56 MMHG

## 2022-12-24 RX ORDER — ACETAMINOPHEN 500 MG
3 TABLET ORAL
Qty: 0 | Refills: 0 | DISCHARGE
Start: 2022-12-24

## 2022-12-24 RX ORDER — IBUPROFEN 200 MG
1 TABLET ORAL
Qty: 0 | Refills: 0 | DISCHARGE
Start: 2022-12-24

## 2022-12-24 RX ADMIN — SODIUM CHLORIDE 3 MILLILITER(S): 9 INJECTION INTRAMUSCULAR; INTRAVENOUS; SUBCUTANEOUS at 14:13

## 2022-12-24 RX ADMIN — Medication 600 MILLIGRAM(S): at 07:30

## 2022-12-24 RX ADMIN — SODIUM CHLORIDE 3 MILLILITER(S): 9 INJECTION INTRAMUSCULAR; INTRAVENOUS; SUBCUTANEOUS at 06:48

## 2022-12-24 RX ADMIN — Medication 600 MILLIGRAM(S): at 12:40

## 2022-12-24 RX ADMIN — Medication 1 TABLET(S): at 11:56

## 2022-12-24 RX ADMIN — Medication 975 MILLIGRAM(S): at 03:55

## 2022-12-24 RX ADMIN — Medication 600 MILLIGRAM(S): at 06:49

## 2022-12-24 RX ADMIN — Medication 600 MILLIGRAM(S): at 17:38

## 2022-12-24 RX ADMIN — Medication 600 MILLIGRAM(S): at 11:56

## 2022-12-24 RX ADMIN — Medication 600 MILLIGRAM(S): at 18:25

## 2022-12-24 NOTE — DISCHARGE NOTE OB - PATIENT PORTAL LINK FT
Prescription approved per [facility] Refill Protocol.  Haylie Posadas RN on 6/17/2019 at 2:35 PM     You can access the FollowMyHealth Patient Portal offered by [facility] by registering at the following website: [email or web address]. By joining [employer]’s FollowMyHealth portal, you will also be able to view your health information using other applications (apps) compatible with our system.

## 2022-12-24 NOTE — DISCHARGE NOTE OB - NS MD DC FALL RISK RISK
For information on Fall & Injury Prevention, visit: [email or web address] OR  [email or web address] OR  https://www.cdc.gov/steadi/patient.html

## 2022-12-24 NOTE — PROGRESS NOTE ADULT - SUBJECTIVE AND OBJECTIVE BOX
Patient seen resting comfortably. No acute events overnight, no current complaints. Pain controlled with motrin. She reports lochia as minimal, no fevers, chills, nausea, vomiting, SOB, palpitations, dizziness. Patient is ambulating, tolerating diet, voiding freely without issue. Breastfeeding without difficulty.     Exam:  Gen: AAOx3  Breast: no engorgement, erythema, or tenderness  Abd: soft, non-tender, non-distended, uterus firm and to umbilicus  Ext: non-tender LE bilaterally    A: s/p NSD PPD#1, stable    Plan  - continue routine pp care  - encourage ambulating and breastfeeding  - motrin for pain  
Patient evaluated by Dr. Larsen.    Patient seen at bedside for evaluation of labor progression.  Desires epidural for pain management.    Vital Signs Last 24 Hrs  T(C): 36.5 (23 Dec 2022 00:07), Max: 36.6 (22 Dec 2022 19:08)  T(F): 97.7 (23 Dec 2022 00:07), Max: 97.9 (22 Dec 2022 19:08)  HR: 73 (23 Dec 2022 03:33) (69 - 99)  BP: 120/59 (23 Dec 2022 03:25) (101/52 - 136/83)  RR: 18 (22 Dec 2022 19:08) (18 - 22)  SpO2: 98% (23 Dec 2022 03:33) (97% - 100%)    Parameters below as of 22 Dec 2022 19:08  Patient On (Oxygen Delivery Method): room air      EFM: 140/mod/+accel, cat I  TOCO: q2m  SVE: 3/70/-2 @0118 per Dr. Larsen    Labs:                        10.6   8.71  )-----------( 157      ( 22 Dec 2022 20:37 )             32.4           ABO RH Interpretation: B POS (12-22-22 @ 20:37)      RPR NR  COVID-19 neg    Meds: ampicillin  IVPB 1 Gram(s) IV Intermittent every 4 hours  chlorhexidine 2% Cloths 1 Application(s) Topical once  lactated ringers. 1000 milliLiter(s) IV Continuous <Continuous>  oxytocin Infusion 333.333 milliUNIT(s)/Min IV Continuous <Continuous>  oxytocin Infusion. 2 milliUNIT(s)/Min IV Continuous <Continuous>, started @1950, now at 10  ampicillin  IVPB 2 Gram(s) IV Intermittent once, first dose @1950      
Pt is 42 wks 1 day pregnant.  Estimated weight using sono yesterday 4430 gm.  pt counselled at length on the risks of shoulder dystocia at this size,  Shoulder dystocia explained to pt in detail and the risk of injury this poses to the baby.  Pt offered primary  section as the safest option in this scenario, and pt is refusing this. Pt is willing to stay and be given oxytocin.  Her pelvic exam is currently 2 cm 70% effaced at -2 station.  Explained to patient if her labor progress arrests at any point once she is in active phase of labor this is a sign that the baby is too big to deliver vaginally and she should accept  delivery at that time.  Patient is willing to have epidural for pain.

## 2022-12-28 DIAGNOSIS — O36.63X0 MATERNAL CARE FOR EXCESSIVE FETAL GROWTH, THIRD TRIMESTER, NOT APPLICABLE OR UNSPECIFIED: ICD-10-CM

## 2022-12-28 DIAGNOSIS — O48.1 PROLONGED PREGNANCY: ICD-10-CM

## 2022-12-28 DIAGNOSIS — Z3A.42 42 WEEKS GESTATION OF PREGNANCY: ICD-10-CM

## 2023-03-01 ENCOUNTER — APPOINTMENT (OUTPATIENT)
Dept: OBGYN | Facility: CLINIC | Age: 33
End: 2023-03-01

## 2023-03-07 NOTE — OB PROVIDER H&P - PROBLEM SELECTOR PLAN 2
Assisted  with ultrasound guided ramona  localization of the left axilla. Vivienne Jolly identified with spelling of name and date of birth. Medications and allergies reviewed. The following allergies were reported   Cat Hair ExtractANAPHYLAXIS, HIVES, RASH   Codeine     Order verified. Procedure explained and questions answered. Vivienne Jolly verbalized understanding and agreement. 6084: Written consent obtained by imaging staff. 1002: Scans taken by Yeni Bee- ultrasound technologist    1006: Dr. Jv Hall present    [id number]: Time out complete. 1007: Site prepped and draped in a sterile manner. 1009: Lidocaine administered for anesthetic affect. 1010: 16G x 7.5cm reflector needle placed- left axillary mass butterfly shaped clip biopsy demonstrated fibroepithelial lesion  ultrasound images obtained,to confirm needle placement, ramona seed deployed  1011: ramona seed audible with detector wand    Luis E Horan tolerated procedure well. Site cleaned and steri strips applied    1025: Vivienne Jolly brought to mammography for post localization images in stable condition. Ms. Fraserline Forward without complaints or concerns at this time. Transfer of care to mammography technologist- Kiersten Eason.  Mammography staff to discharge Ms. Horan after imaging complete. normal spontaneous vaginal delivery

## 2023-03-16 PROBLEM — Z78.9 OTHER SPECIFIED HEALTH STATUS: Chronic | Status: ACTIVE | Noted: 2022-12-22

## 2023-04-18 ENCOUNTER — APPOINTMENT (OUTPATIENT)
Dept: OBGYN | Facility: CLINIC | Age: 33
End: 2023-04-18

## 2023-06-28 ENCOUNTER — APPOINTMENT (OUTPATIENT)
Dept: OBGYN | Facility: CLINIC | Age: 33
End: 2023-06-28
Payer: MEDICAID

## 2023-06-28 VITALS — BODY MASS INDEX: 33.09 KG/M2 | DIASTOLIC BLOOD PRESSURE: 62 MMHG | SYSTOLIC BLOOD PRESSURE: 104 MMHG | WEIGHT: 205 LBS

## 2023-06-28 DIAGNOSIS — Z30.09 ENCOUNTER FOR OTHER GENERAL COUNSELING AND ADVICE ON CONTRACEPTION: ICD-10-CM

## 2023-06-28 PROCEDURE — 99212 OFFICE O/P EST SF 10 MIN: CPT

## 2023-06-28 NOTE — HISTORY OF PRESENT ILLNESS
[FreeTextEntry1] : pt here to discuss contraeption. no complaints. desires LARC likely. discussed r/b/a of all contraception options. patient is againts usuing horomonal options. wants paragard IUD.

## 2023-08-17 ENCOUNTER — APPOINTMENT (OUTPATIENT)
Dept: OBGYN | Facility: CLINIC | Age: 33
End: 2023-08-17

## 2023-10-11 NOTE — OB RN DELIVERY SUMMARY - NSNUMBEROFNEWBORNS_OBGYN_ALL_OB_NU
Detail Level: Detailed
Depth Of Biopsy: dermis
Was A Bandage Applied: Yes
Size Of Lesion In Cm: 0
Biopsy Type: H and E
Biopsy Method: Dermablade
Anesthesia Type: 1% lidocaine with epinephrine
Anesthesia Volume In Cc: 1
Hemostasis: Aluminum Chloride
Wound Care: Petrolatum
Dressing: bandage
Destruction After The Procedure: No
Type Of Destruction Used: Curettage
Curettage Text: The wound bed was treated with curettage after the biopsy was performed.
Cryotherapy Text: The wound bed was treated with cryotherapy after the biopsy was performed.
Electrodesiccation Text: The wound bed was treated with electrodesiccation after the biopsy was performed.
Electrodesiccation And Curettage Text: The wound bed was treated with electrodesiccation and curettage after the biopsy was performed.
Silver Nitrate Text: The wound bed was treated with silver nitrate after the biopsy was performed.
Lab: [id number]
Lab Facility: [id number]
Consent: Written consent was obtained and risks were reviewed including but not limited to scarring, infection, bleeding, scabbing, incomplete removal, nerve damage and allergy to anesthesia.
Post-Care Instructions: I reviewed with the patient in detail post-care instructions. Patient is to keep the biopsy site dry overnight, and then apply bacitracin twice daily until healed. Patient may apply hydrogen peroxide soaks to remove any crusting.
Notification Instructions: Patient will be notified of biopsy results. However, patient instructed to call the office if not contacted within 2 weeks.
Billing Type: Third-Party Bill
Information: Selecting Yes will display possible errors in your note based on the variables you have selected. This validation is only offered as a suggestion for you. PLEASE NOTE THAT THE VALIDATION TEXT WILL BE REMOVED WHEN YOU FINALIZE YOUR NOTE. IF YOU WANT TO FAX A PRELIMINARY NOTE YOU WILL NEED TO TOGGLE THIS TO 'NO' IF YOU DO NOT WANT IT IN YOUR FAXED NOTE.
1

## 2023-10-25 ENCOUNTER — APPOINTMENT (OUTPATIENT)
Dept: OBGYN | Facility: CLINIC | Age: 33
End: 2023-10-25
Payer: MEDICAID

## 2023-10-25 ENCOUNTER — NON-APPOINTMENT (OUTPATIENT)
Age: 33
End: 2023-10-25

## 2023-10-25 ENCOUNTER — RESULT CHARGE (OUTPATIENT)
Age: 33
End: 2023-10-25

## 2023-10-25 VITALS — SYSTOLIC BLOOD PRESSURE: 101 MMHG | DIASTOLIC BLOOD PRESSURE: 58 MMHG | BODY MASS INDEX: 33.9 KG/M2 | WEIGHT: 210 LBS

## 2023-10-25 DIAGNOSIS — Z00.00 ENCOUNTER FOR GENERAL ADULT MEDICAL EXAMINATION W/OUT ABNORMAL FINDINGS: ICD-10-CM

## 2023-10-25 DIAGNOSIS — Z32.01 ENCOUNTER FOR PREGNANCY TEST, RESULT POSITIVE: ICD-10-CM

## 2023-10-25 LAB
BILIRUB UR QL STRIP: NORMAL
GLUCOSE UR-MCNC: NORMAL
HCG UR QL: 0.2 EU/DL
HCG UR QL: POSITIVE
HGB UR QL STRIP.AUTO: NORMAL
KETONES UR-MCNC: NORMAL
LEUKOCYTE ESTERASE UR QL STRIP: NORMAL
NITRITE UR QL STRIP: NORMAL
PH UR STRIP: 6
PROT UR STRIP-MCNC: NORMAL
QUALITY CONTROL: YES
SP GR UR STRIP: 1

## 2023-10-25 PROCEDURE — 81025 URINE PREGNANCY TEST: CPT

## 2023-10-25 PROCEDURE — 99213 OFFICE O/P EST LOW 20 MIN: CPT | Mod: 25

## 2023-10-25 PROCEDURE — 76817 TRANSVAGINAL US OBSTETRIC: CPT

## 2023-10-25 PROCEDURE — 81003 URINALYSIS AUTO W/O SCOPE: CPT | Mod: QW

## 2023-11-10 ENCOUNTER — APPOINTMENT (OUTPATIENT)
Dept: OBGYN | Facility: CLINIC | Age: 33
End: 2023-11-10
Payer: MEDICAID

## 2023-11-10 VITALS — BODY MASS INDEX: 34.22 KG/M2 | DIASTOLIC BLOOD PRESSURE: 60 MMHG | SYSTOLIC BLOOD PRESSURE: 98 MMHG | WEIGHT: 212 LBS

## 2023-11-10 LAB
BILIRUB UR QL STRIP: NORMAL
GLUCOSE UR-MCNC: NORMAL
HCG UR QL: 0.2 EU/DL
HCG UR QL: POSITIVE
HGB UR QL STRIP.AUTO: NORMAL
KETONES UR-MCNC: NORMAL
LEUKOCYTE ESTERASE UR QL STRIP: NORMAL
NITRITE UR QL STRIP: NORMAL
PH UR STRIP: 5.5
PROT UR STRIP-MCNC: NORMAL
SP GR UR STRIP: >= 1.03

## 2023-11-10 PROCEDURE — 81025 URINE PREGNANCY TEST: CPT

## 2023-11-10 PROCEDURE — 0501F PRENATAL FLOW SHEET: CPT

## 2023-11-10 PROCEDURE — 81003 URINALYSIS AUTO W/O SCOPE: CPT | Mod: NC,QW

## 2023-11-11 LAB
HBV SURFACE AG SER QL: NONREACTIVE
HCT VFR BLD CALC: 35.2 %
HCV AB SER QL: NONREACTIVE
HCV S/CO RATIO: 0.1 S/CO
HGB BLD-MCNC: 11.6 G/DL
HIV1+2 AB SPEC QL IA.RAPID: NONREACTIVE
HPV HIGH+LOW RISK DNA PNL CVX: NOT DETECTED
MCHC RBC-ENTMCNC: 28.4 PG
MCHC RBC-ENTMCNC: 33 GM/DL
MCV RBC AUTO: 86.1 FL
MEV IGG FLD QL IA: >300 AU/ML
MEV IGG+IGM SER-IMP: POSITIVE
MUV AB SER-ACNC: POSITIVE
MUV IGG SER QL IA: 79 AU/ML
PLATELET # BLD AUTO: 180 K/UL
RBC # BLD: 4.09 M/UL
RBC # FLD: 12.9 %
RUBV IGG FLD-ACNC: 4.2 INDEX
RUBV IGG SER-IMP: POSITIVE
T PALLIDUM AB SER QL IA: NEGATIVE
VZV AB TITR SER: POSITIVE
VZV IGG SER IF-ACNC: 351.9 INDEX
WBC # FLD AUTO: 6.83 K/UL

## 2023-11-12 LAB
C TRACH RRNA SPEC QL NAA+PROBE: NOT DETECTED
N GONORRHOEA RRNA SPEC QL NAA+PROBE: NOT DETECTED
SOURCE TP AMPLIFICATION: NORMAL

## 2023-11-13 LAB
ABO + RH PNL BLD: NORMAL
BACTERIA UR CULT: NORMAL
BLD GP AB SCN SERPL QL: NORMAL
LEAD BLD-MCNC: <1 UG/DL

## 2023-11-14 LAB — CYTOLOGY CVX/VAG DOC THIN PREP: NORMAL

## 2023-11-22 NOTE — OB RN DELIVERY SUMMARY - BABY A: APGAR 5 MIN COLOR, DELIVERY
(1) body pink, extremities blue Nsaids Pregnancy And Lactation Text: These medications are considered safe up to 30 weeks gestation. It is excreted in breast milk.

## 2023-12-20 ENCOUNTER — APPOINTMENT (OUTPATIENT)
Dept: OBGYN | Facility: CLINIC | Age: 33
End: 2023-12-20
Payer: MEDICAID

## 2023-12-20 ENCOUNTER — NON-APPOINTMENT (OUTPATIENT)
Age: 33
End: 2023-12-20

## 2023-12-20 PROCEDURE — 0502F SUBSEQUENT PRENATAL CARE: CPT

## 2024-01-05 NOTE — OB PROVIDER H&P - NS_ADMITDT_OBGYN_ALL_OB_DT
1/5/2024       RE: Fer Cantu  [address]  [facility] [address]     Dear Colleague,    Thank you for referring your patient, Fer Cantu, to the [facility]. Please see a copy of my visit note below.    PROCEDURE NOTE: Intramuscular Botulinum Toxin-A (Botox) Injection Under Ultrasound Guidance    PROCEDURE DATE: 1-5-2024    PATIENT NAME: Fer Cantu  YOB: 1968    ATTENDING PHYSICIAN: Laya KEARNEY Cha, MD  PRIOR BOTOX  7-13-23  20 units per anterior scalene muscle on the bilateral side(s)  20 units per sternocleidomastoid muscle on the bilateral side(s)    10-5-23  25 units per anterior scalene muscle on the bilateral side(s)  25 units per sternocleidomastoid muscle on the bilateral side(s)    PREOPERATIVE DIAGNOSIS:   Cervical dystonia  (primary encounter diagnosis)  Compression of vein  Muscle pain     POSTOPERATIVE DIAGNOSIS: same    PROCEDURE PERFORMED: Bilateral Sternocleidomastoid and Anterior Scalene Muscle Botulinum Toxin-A (Botox) Injection Under Ultrasound Guidance    ULTRASOUND WAS USED.     INDICATIONS FOR THE PROCEDURE:  Fer Cantu is a 55 year old male who presents with cervical dystonia.    PROCEDURE AND FINDINGS:  He was greeted in the clinic. The risk, benefits and alternatives to the procedure were again reviewed with him and informed consent was obtained and the patient agreed to proceed. A time-out was performed. Following review alternatives, benefits and risks, the procedure was carried out under sterile prep with sterile gel. The use of direct sonographic guidance was used to ensure accurate placement of the needle (rather than non-guided injection) and required to minimize the risk of bleeding or injury to nearby neurovascular structures. Images were recorded and stored.     A 15-6MHz ultrasound transducer was used to visualize the  relevant structures and determine the optimal needle path for the procedure. A 27 gauge 1.5 inch needle was advanced utilizing an out-of-plane approach, under continuous ultrasound guidance to the sternocleidomastoid and anterior scalene muscle(s) on the bilateral side(s). The tip of the needle was visualized throughout the procedure. The remainder of the single-use vials were discarded.      100 units of botulinum toxin was diluted 50 units/mL of preservative free saline. The following muscles were injected:    20 units per anterior scalene muscle on the bilateral side(s)  25 units per sternocleidomastoid muscle on the bilateral side(s)  ____________________________________  90 total units used.   The remainder (10 units) of the single-use vials were discarded.        He tolerated the procedure well, was discharged home in stable condition.     Follow-up will be in clinic      COMPLICATIONS: None    COMMENTS: None                Again, thank you for allowing me to participate in the care of your patient.      Sincerely,    Laya KEARNEY Cha, MD     05-Mar-2021 00:14

## 2024-01-24 ENCOUNTER — APPOINTMENT (OUTPATIENT)
Dept: OBGYN | Facility: CLINIC | Age: 34
End: 2024-01-24
Payer: MEDICAID

## 2024-01-24 VITALS — SYSTOLIC BLOOD PRESSURE: 107 MMHG | BODY MASS INDEX: 35.51 KG/M2 | WEIGHT: 220 LBS | DIASTOLIC BLOOD PRESSURE: 67 MMHG

## 2024-01-24 LAB
BILIRUB UR QL STRIP: NORMAL
GLUCOSE UR-MCNC: NORMAL
HCG UR QL: 1 EU/DL
HGB UR QL STRIP.AUTO: NORMAL
KETONES UR-MCNC: NORMAL
LEUKOCYTE ESTERASE UR QL STRIP: NORMAL
NITRITE UR QL STRIP: NORMAL
PH UR STRIP: 6.5
PROT UR STRIP-MCNC: NORMAL
SP GR UR STRIP: 1.02

## 2024-01-24 PROCEDURE — 81003 URINALYSIS AUTO W/O SCOPE: CPT | Mod: NC,QW

## 2024-01-24 PROCEDURE — 0502F SUBSEQUENT PRENATAL CARE: CPT

## 2024-02-27 ENCOUNTER — APPOINTMENT (OUTPATIENT)
Dept: OBGYN | Facility: CLINIC | Age: 34
End: 2024-02-27
Payer: MEDICAID

## 2024-02-27 PROCEDURE — 36415 COLL VENOUS BLD VENIPUNCTURE: CPT

## 2024-02-28 ENCOUNTER — APPOINTMENT (OUTPATIENT)
Dept: OBGYN | Facility: CLINIC | Age: 34
End: 2024-02-28
Payer: MEDICAID

## 2024-02-28 ENCOUNTER — NON-APPOINTMENT (OUTPATIENT)
Age: 34
End: 2024-02-28

## 2024-02-28 VITALS — DIASTOLIC BLOOD PRESSURE: 70 MMHG | SYSTOLIC BLOOD PRESSURE: 115 MMHG | WEIGHT: 224 LBS | BODY MASS INDEX: 36.15 KG/M2

## 2024-02-28 LAB
BILIRUB UR QL STRIP: NORMAL
GLUCOSE 1H P 50 G GLC PO SERPL-MCNC: 89 MG/DL
GLUCOSE UR-MCNC: NORMAL
HCG UR QL: 0.2 EU/DL
HGB UR QL STRIP.AUTO: NORMAL
KETONES UR-MCNC: NORMAL
LEUKOCYTE ESTERASE UR QL STRIP: NORMAL
NITRITE UR QL STRIP: NORMAL
PH UR STRIP: 7
PROT UR STRIP-MCNC: NORMAL
SP GR UR STRIP: 1.02

## 2024-02-28 PROCEDURE — 81003 URINALYSIS AUTO W/O SCOPE: CPT | Mod: NC,QW

## 2024-02-28 PROCEDURE — 0502F SUBSEQUENT PRENATAL CARE: CPT

## 2024-02-29 LAB
BASOPHILS # BLD AUTO: 0.03 K/UL
BASOPHILS NFR BLD AUTO: 0.5 %
EOSINOPHIL # BLD AUTO: 0.06 K/UL
EOSINOPHIL NFR BLD AUTO: 0.9 %
FERRITIN SERPL-MCNC: 9 NG/ML
HCT VFR BLD CALC: 32.3 %
HGB BLD-MCNC: 10 G/DL
IMM GRANULOCYTES NFR BLD AUTO: 0.5 %
LYMPHOCYTES # BLD AUTO: 1.53 K/UL
LYMPHOCYTES NFR BLD AUTO: 23.2 %
MAN DIFF?: NORMAL
MCHC RBC-ENTMCNC: 28.5 PG
MCHC RBC-ENTMCNC: 31 GM/DL
MCV RBC AUTO: 92 FL
MONOCYTES # BLD AUTO: 0.36 K/UL
MONOCYTES NFR BLD AUTO: 5.5 %
NEUTROPHILS # BLD AUTO: 4.59 K/UL
NEUTROPHILS NFR BLD AUTO: 69.4 %
PLATELET # BLD AUTO: 153 K/UL
RBC # BLD: 3.51 M/UL
RBC # FLD: 15.7 %
WBC # FLD AUTO: 6.6 K/UL

## 2024-03-02 ENCOUNTER — NON-APPOINTMENT (OUTPATIENT)
Age: 34
End: 2024-03-02

## 2024-03-27 ENCOUNTER — APPOINTMENT (OUTPATIENT)
Dept: OBGYN | Facility: CLINIC | Age: 34
End: 2024-03-27

## 2024-04-08 ENCOUNTER — APPOINTMENT (OUTPATIENT)
Dept: OBGYN | Facility: CLINIC | Age: 34
End: 2024-04-08
Payer: MEDICAID

## 2024-04-08 VITALS — BODY MASS INDEX: 36.96 KG/M2 | DIASTOLIC BLOOD PRESSURE: 71 MMHG | SYSTOLIC BLOOD PRESSURE: 121 MMHG | WEIGHT: 229 LBS

## 2024-04-08 LAB
BILIRUB UR QL STRIP: NORMAL
GLUCOSE UR-MCNC: NORMAL
HCG UR QL: 0.2 EU/DL
HGB UR QL STRIP.AUTO: NORMAL
KETONES UR-MCNC: NORMAL
LEUKOCYTE ESTERASE UR QL STRIP: NORMAL
NITRITE UR QL STRIP: NORMAL
PH UR STRIP: 7
PROT UR STRIP-MCNC: NORMAL
SP GR UR STRIP: 1.01

## 2024-04-08 PROCEDURE — 81003 URINALYSIS AUTO W/O SCOPE: CPT | Mod: NC,QW

## 2024-04-08 PROCEDURE — 0502F SUBSEQUENT PRENATAL CARE: CPT

## 2024-05-09 ENCOUNTER — APPOINTMENT (OUTPATIENT)
Dept: OBGYN | Facility: CLINIC | Age: 34
End: 2024-05-09
Payer: MEDICAID

## 2024-05-09 ENCOUNTER — LABORATORY RESULT (OUTPATIENT)
Age: 34
End: 2024-05-09

## 2024-05-09 VITALS — DIASTOLIC BLOOD PRESSURE: 70 MMHG | BODY MASS INDEX: 37.12 KG/M2 | WEIGHT: 230 LBS | SYSTOLIC BLOOD PRESSURE: 112 MMHG

## 2024-05-09 LAB
BILIRUB UR QL STRIP: NORMAL
GLUCOSE UR-MCNC: NORMAL
HCG UR QL: 0.2 EU/DL
HGB UR QL STRIP.AUTO: NORMAL
KETONES UR-MCNC: NORMAL
LEUKOCYTE ESTERASE UR QL STRIP: NORMAL
NITRITE UR QL STRIP: NORMAL
PH UR STRIP: 6
PROT UR STRIP-MCNC: NORMAL
SP GR UR STRIP: 1.03

## 2024-05-09 PROCEDURE — 81003 URINALYSIS AUTO W/O SCOPE: CPT | Mod: NC,QW

## 2024-05-09 PROCEDURE — 0502F SUBSEQUENT PRENATAL CARE: CPT

## 2024-05-09 PROCEDURE — 90715 TDAP VACCINE 7 YRS/> IM: CPT

## 2024-05-09 PROCEDURE — 76816 OB US FOLLOW-UP PER FETUS: CPT

## 2024-05-09 PROCEDURE — 90471 IMMUNIZATION ADMIN: CPT

## 2024-05-10 LAB
BASOPHILS # BLD AUTO: 0 K/UL
BASOPHILS NFR BLD AUTO: 0 %
EOSINOPHIL # BLD AUTO: 0.07 K/UL
EOSINOPHIL NFR BLD AUTO: 0.9 %
HCT VFR BLD CALC: 31.2 %
HGB BLD-MCNC: 9.8 G/DL
HIV1+2 AB SPEC QL IA.RAPID: NONREACTIVE
LYMPHOCYTES # BLD AUTO: 1.48 K/UL
LYMPHOCYTES NFR BLD AUTO: 18.4 %
MAN DIFF?: NORMAL
MCHC RBC-ENTMCNC: 27.3 PG
MCHC RBC-ENTMCNC: 31.4 GM/DL
MCV RBC AUTO: 86.9 FL
MONOCYTES # BLD AUTO: 0.35 K/UL
MONOCYTES NFR BLD AUTO: 4.4 %
NEUTROPHILS # BLD AUTO: 6.05 K/UL
NEUTROPHILS NFR BLD AUTO: 75.4 %
PLATELET # BLD AUTO: 136 K/UL
RBC # BLD: 3.59 M/UL
RBC # FLD: 14.9 %
T PALLIDUM AB SER QL IA: NEGATIVE
WBC # FLD AUTO: 8.02 K/UL

## 2024-05-13 LAB — B-HEM STREP SPEC QL CULT: NORMAL

## 2024-05-16 ENCOUNTER — APPOINTMENT (OUTPATIENT)
Dept: OBGYN | Facility: CLINIC | Age: 34
End: 2024-05-16
Payer: MEDICAID

## 2024-05-16 PROCEDURE — 81003 URINALYSIS AUTO W/O SCOPE: CPT | Mod: NC,QW

## 2024-05-16 PROCEDURE — 0502F SUBSEQUENT PRENATAL CARE: CPT

## 2024-05-23 ENCOUNTER — APPOINTMENT (OUTPATIENT)
Dept: OBGYN | Facility: CLINIC | Age: 34
End: 2024-05-23
Payer: MEDICAID

## 2024-05-23 VITALS — BODY MASS INDEX: 37.61 KG/M2 | DIASTOLIC BLOOD PRESSURE: 64 MMHG | WEIGHT: 233 LBS | SYSTOLIC BLOOD PRESSURE: 116 MMHG

## 2024-05-23 LAB
BILIRUB UR QL STRIP: NORMAL
GLUCOSE UR-MCNC: NORMAL
HCG UR QL: 0.2 EU/DL
HGB UR QL STRIP.AUTO: NORMAL
KETONES UR-MCNC: NORMAL
LEUKOCYTE ESTERASE UR QL STRIP: NORMAL
NITRITE UR QL STRIP: NORMAL
PH UR STRIP: 6.5
PROT UR STRIP-MCNC: NORMAL
SP GR UR STRIP: 1.03

## 2024-05-23 PROCEDURE — 81003 URINALYSIS AUTO W/O SCOPE: CPT | Mod: NC,QW

## 2024-05-23 PROCEDURE — 0502F SUBSEQUENT PRENATAL CARE: CPT

## 2024-05-23 PROCEDURE — 76819 FETAL BIOPHYS PROFIL W/O NST: CPT

## 2024-05-29 ENCOUNTER — APPOINTMENT (OUTPATIENT)
Dept: OBGYN | Facility: CLINIC | Age: 34
End: 2024-05-29
Payer: MEDICAID

## 2024-05-29 VITALS
HEIGHT: 66 IN | SYSTOLIC BLOOD PRESSURE: 107 MMHG | DIASTOLIC BLOOD PRESSURE: 70 MMHG | WEIGHT: 232 LBS | BODY MASS INDEX: 37.28 KG/M2

## 2024-05-29 DIAGNOSIS — Z34.90 ENCOUNTER FOR SUPERVISION OF NORMAL PREGNANCY, UNSPECIFIED, UNSPECIFIED TRIMESTER: ICD-10-CM

## 2024-05-29 LAB
BILIRUB UR QL STRIP: NORMAL
GLUCOSE UR-MCNC: NORMAL
HCG UR QL: 0.2 EU/DL
HGB UR QL STRIP.AUTO: NORMAL
KETONES UR-MCNC: NORMAL
LEUKOCYTE ESTERASE UR QL STRIP: NORMAL
NITRITE UR QL STRIP: NORMAL
PH UR STRIP: 6.5
PROT UR STRIP-MCNC: NORMAL
SP GR UR STRIP: >=1.03

## 2024-05-29 PROCEDURE — 81003 URINALYSIS AUTO W/O SCOPE: CPT | Mod: NC,QW

## 2024-05-29 PROCEDURE — 76816 OB US FOLLOW-UP PER FETUS: CPT

## 2024-05-29 PROCEDURE — 0502F SUBSEQUENT PRENATAL CARE: CPT

## 2024-06-02 ENCOUNTER — NON-APPOINTMENT (OUTPATIENT)
Age: 34
End: 2024-06-02

## 2024-06-03 ENCOUNTER — INPATIENT (INPATIENT)
Facility: HOSPITAL | Age: 34
LOS: 0 days | Discharge: HOME | DRG: 566 | End: 2024-06-04
Attending: OBSTETRICS & GYNECOLOGY | Admitting: OBSTETRICS & GYNECOLOGY
Payer: MEDICAID

## 2024-06-03 VITALS — DIASTOLIC BLOOD PRESSURE: 65 MMHG | SYSTOLIC BLOOD PRESSURE: 127 MMHG | HEART RATE: 83 BPM

## 2024-06-03 DIAGNOSIS — O42.92 FULL-TERM PREMATURE RUPTURE OF MEMBRANES, UNSPECIFIED AS TO LENGTH OF TIME BETWEEN RUPTURE AND ONSET OF LABOR: ICD-10-CM

## 2024-06-03 DIAGNOSIS — O26.899 OTHER SPECIFIED PREGNANCY RELATED CONDITIONS, UNSPECIFIED TRIMESTER: ICD-10-CM

## 2024-06-03 LAB
APPEARANCE UR: CLEAR — SIGNIFICANT CHANGE UP
BASOPHILS # BLD AUTO: 0.01 K/UL — SIGNIFICANT CHANGE UP (ref 0–0.2)
BASOPHILS NFR BLD AUTO: 0.1 % — SIGNIFICANT CHANGE UP (ref 0–1)
BILIRUB UR-MCNC: NEGATIVE — SIGNIFICANT CHANGE UP
COLOR SPEC: YELLOW — SIGNIFICANT CHANGE UP
DIFF PNL FLD: NEGATIVE — SIGNIFICANT CHANGE UP
EOSINOPHIL # BLD AUTO: 0.03 K/UL — SIGNIFICANT CHANGE UP (ref 0–0.7)
EOSINOPHIL NFR BLD AUTO: 0.3 % — SIGNIFICANT CHANGE UP (ref 0–8)
GLUCOSE UR QL: NEGATIVE MG/DL — SIGNIFICANT CHANGE UP
HCT VFR BLD CALC: 30.3 % — LOW (ref 37–47)
HGB BLD-MCNC: 9.9 G/DL — LOW (ref 12–16)
IMM GRANULOCYTES NFR BLD AUTO: 0.7 % — HIGH (ref 0.1–0.3)
KETONES UR-MCNC: NEGATIVE MG/DL — SIGNIFICANT CHANGE UP
L&D DRUG SCREEN, URINE: SIGNIFICANT CHANGE UP
LEUKOCYTE ESTERASE UR-ACNC: NEGATIVE — SIGNIFICANT CHANGE UP
LYMPHOCYTES # BLD AUTO: 1.86 K/UL — SIGNIFICANT CHANGE UP (ref 1.2–3.4)
LYMPHOCYTES # BLD AUTO: 19 % — LOW (ref 20.5–51.1)
MCHC RBC-ENTMCNC: 26.8 PG — LOW (ref 27–31)
MCHC RBC-ENTMCNC: 32.7 G/DL — SIGNIFICANT CHANGE UP (ref 32–37)
MCV RBC AUTO: 81.9 FL — SIGNIFICANT CHANGE UP (ref 81–99)
MONOCYTES # BLD AUTO: 0.53 K/UL — SIGNIFICANT CHANGE UP (ref 0.1–0.6)
MONOCYTES NFR BLD AUTO: 5.4 % — SIGNIFICANT CHANGE UP (ref 1.7–9.3)
NEUTROPHILS # BLD AUTO: 7.28 K/UL — HIGH (ref 1.4–6.5)
NEUTROPHILS NFR BLD AUTO: 74.5 % — SIGNIFICANT CHANGE UP (ref 42.2–75.2)
NITRITE UR-MCNC: NEGATIVE — SIGNIFICANT CHANGE UP
NRBC # BLD: 0 /100 WBCS — SIGNIFICANT CHANGE UP (ref 0–0)
PH UR: 7 — SIGNIFICANT CHANGE UP (ref 5–8)
PLATELET # BLD AUTO: 152 K/UL — SIGNIFICANT CHANGE UP (ref 130–400)
PMV BLD: 10.7 FL — HIGH (ref 7.4–10.4)
PRENATAL SYPHILIS TEST: SIGNIFICANT CHANGE UP
PROT UR-MCNC: NEGATIVE MG/DL — SIGNIFICANT CHANGE UP
RBC # BLD: 3.7 M/UL — LOW (ref 4.2–5.4)
RBC # FLD: 15.2 % — HIGH (ref 11.5–14.5)
SP GR SPEC: 1.02 — SIGNIFICANT CHANGE UP (ref 1–1.03)
UROBILINOGEN FLD QL: 0.2 MG/DL — SIGNIFICANT CHANGE UP (ref 0.2–1)
WBC # BLD: 9.78 K/UL — SIGNIFICANT CHANGE UP (ref 4.8–10.8)
WBC # FLD AUTO: 9.78 K/UL — SIGNIFICANT CHANGE UP (ref 4.8–10.8)

## 2024-06-03 PROCEDURE — 59050 FETAL MONITOR W/REPORT: CPT

## 2024-06-03 PROCEDURE — 86901 BLOOD TYPING SEROLOGIC RH(D): CPT

## 2024-06-03 PROCEDURE — 81003 URINALYSIS AUTO W/O SCOPE: CPT

## 2024-06-03 PROCEDURE — 80307 DRUG TEST PRSMV CHEM ANLYZR: CPT

## 2024-06-03 PROCEDURE — 36415 COLL VENOUS BLD VENIPUNCTURE: CPT

## 2024-06-03 PROCEDURE — 86592 SYPHILIS TEST NON-TREP QUAL: CPT

## 2024-06-03 PROCEDURE — 86850 RBC ANTIBODY SCREEN: CPT

## 2024-06-03 PROCEDURE — 80354 DRUG SCREENING FENTANYL: CPT

## 2024-06-03 PROCEDURE — 85025 COMPLETE CBC W/AUTO DIFF WBC: CPT

## 2024-06-03 PROCEDURE — 86900 BLOOD TYPING SEROLOGIC ABO: CPT

## 2024-06-03 PROCEDURE — 59400 OBSTETRICAL CARE: CPT | Mod: U9

## 2024-06-03 RX ORDER — KETOROLAC TROMETHAMINE 30 MG/ML
30 SYRINGE (ML) INJECTION ONCE
Refills: 0 | Status: DISCONTINUED | OUTPATIENT
Start: 2024-06-03 | End: 2024-06-03

## 2024-06-03 RX ORDER — CALCIUM CARBONATE 500(1250)
2 TABLET ORAL EVERY 6 HOURS
Refills: 0 | Status: DISCONTINUED | OUTPATIENT
Start: 2024-06-03 | End: 2024-06-03

## 2024-06-03 RX ORDER — AMPICILLIN TRIHYDRATE 250 MG
1 CAPSULE ORAL EVERY 4 HOURS
Refills: 0 | Status: DISCONTINUED | OUTPATIENT
Start: 2024-06-03 | End: 2024-06-03

## 2024-06-03 RX ORDER — OXYTOCIN 10 UNIT/ML
41.67 VIAL (ML) INJECTION
Qty: 20 | Refills: 0 | Status: DISCONTINUED | OUTPATIENT
Start: 2024-06-03 | End: 2024-06-04

## 2024-06-03 RX ORDER — AMPICILLIN TRIHYDRATE 250 MG
2 CAPSULE ORAL ONCE
Refills: 0 | Status: COMPLETED | OUTPATIENT
Start: 2024-06-03 | End: 2024-06-03

## 2024-06-03 RX ORDER — DIBUCAINE 1 %
1 OINTMENT (GRAM) RECTAL EVERY 6 HOURS
Refills: 0 | Status: DISCONTINUED | OUTPATIENT
Start: 2024-06-03 | End: 2024-06-04

## 2024-06-03 RX ORDER — AER TRAVELER 0.5 G/1
1 SOLUTION RECTAL; TOPICAL EVERY 4 HOURS
Refills: 0 | Status: DISCONTINUED | OUTPATIENT
Start: 2024-06-03 | End: 2024-06-04

## 2024-06-03 RX ORDER — HYDROCORTISONE 1 %
1 OINTMENT (GRAM) TOPICAL EVERY 6 HOURS
Refills: 0 | Status: DISCONTINUED | OUTPATIENT
Start: 2024-06-03 | End: 2024-06-04

## 2024-06-03 RX ORDER — IBUPROFEN 200 MG
600 TABLET ORAL EVERY 6 HOURS
Refills: 0 | Status: DISCONTINUED | OUTPATIENT
Start: 2024-06-03 | End: 2024-06-04

## 2024-06-03 RX ORDER — DIPHENHYDRAMINE HCL 50 MG
25 CAPSULE ORAL EVERY 6 HOURS
Refills: 0 | Status: DISCONTINUED | OUTPATIENT
Start: 2024-06-03 | End: 2024-06-04

## 2024-06-03 RX ORDER — OXYTOCIN 10 UNIT/ML
2 VIAL (ML) INJECTION
Qty: 30 | Refills: 0 | Status: DISCONTINUED | OUTPATIENT
Start: 2024-06-03 | End: 2024-06-03

## 2024-06-03 RX ORDER — TETANUS TOXOID, REDUCED DIPHTHERIA TOXOID AND ACELLULAR PERTUSSIS VACCINE, ADSORBED 5; 2.5; 8; 8; 2.5 [IU]/.5ML; [IU]/.5ML; UG/.5ML; UG/.5ML; UG/.5ML
0.5 SUSPENSION INTRAMUSCULAR ONCE
Refills: 0 | Status: DISCONTINUED | OUTPATIENT
Start: 2024-06-03 | End: 2024-06-04

## 2024-06-03 RX ORDER — PRAMOXINE HYDROCHLORIDE 150 MG/15G
1 AEROSOL, FOAM RECTAL EVERY 4 HOURS
Refills: 0 | Status: DISCONTINUED | OUTPATIENT
Start: 2024-06-03 | End: 2024-06-04

## 2024-06-03 RX ORDER — ACETAMINOPHEN 500 MG
975 TABLET ORAL
Refills: 0 | Status: DISCONTINUED | OUTPATIENT
Start: 2024-06-03 | End: 2024-06-04

## 2024-06-03 RX ORDER — MAGNESIUM HYDROXIDE 400 MG/1
30 TABLET, CHEWABLE ORAL
Refills: 0 | Status: DISCONTINUED | OUTPATIENT
Start: 2024-06-03 | End: 2024-06-04

## 2024-06-03 RX ORDER — OXYCODONE HYDROCHLORIDE 5 MG/1
5 TABLET ORAL
Refills: 0 | Status: DISCONTINUED | OUTPATIENT
Start: 2024-06-03 | End: 2024-06-04

## 2024-06-03 RX ORDER — IBUPROFEN 200 MG
600 TABLET ORAL EVERY 6 HOURS
Refills: 0 | Status: COMPLETED | OUTPATIENT
Start: 2024-06-03 | End: 2025-05-02

## 2024-06-03 RX ORDER — OXYCODONE HYDROCHLORIDE 5 MG/1
5 TABLET ORAL ONCE
Refills: 0 | Status: DISCONTINUED | OUTPATIENT
Start: 2024-06-03 | End: 2024-06-04

## 2024-06-03 RX ORDER — BENZOCAINE 10 %
1 GEL (GRAM) MUCOUS MEMBRANE EVERY 6 HOURS
Refills: 0 | Status: DISCONTINUED | OUTPATIENT
Start: 2024-06-03 | End: 2024-06-04

## 2024-06-03 RX ORDER — SODIUM CHLORIDE 9 MG/ML
3 INJECTION INTRAMUSCULAR; INTRAVENOUS; SUBCUTANEOUS EVERY 8 HOURS
Refills: 0 | Status: DISCONTINUED | OUTPATIENT
Start: 2024-06-03 | End: 2024-06-04

## 2024-06-03 RX ORDER — CHLORHEXIDINE GLUCONATE 213 G/1000ML
1 SOLUTION TOPICAL DAILY
Refills: 0 | Status: DISCONTINUED | OUTPATIENT
Start: 2024-06-03 | End: 2024-06-03

## 2024-06-03 RX ORDER — SIMETHICONE 80 MG/1
80 TABLET, CHEWABLE ORAL EVERY 4 HOURS
Refills: 0 | Status: DISCONTINUED | OUTPATIENT
Start: 2024-06-03 | End: 2024-06-04

## 2024-06-03 RX ORDER — LANOLIN
1 OINTMENT (GRAM) TOPICAL EVERY 6 HOURS
Refills: 0 | Status: DISCONTINUED | OUTPATIENT
Start: 2024-06-03 | End: 2024-06-04

## 2024-06-03 RX ORDER — SODIUM CHLORIDE 9 MG/ML
1000 INJECTION, SOLUTION INTRAVENOUS
Refills: 0 | Status: DISCONTINUED | OUTPATIENT
Start: 2024-06-03 | End: 2024-06-03

## 2024-06-03 RX ADMIN — Medication 125 MILLIUNIT(S)/MIN: at 11:05

## 2024-06-03 RX ADMIN — SODIUM CHLORIDE 3 MILLILITER(S): 9 INJECTION INTRAMUSCULAR; INTRAVENOUS; SUBCUTANEOUS at 13:16

## 2024-06-03 RX ADMIN — Medication 200 GRAM(S): at 02:56

## 2024-06-03 RX ADMIN — Medication 108 GRAM(S): at 06:56

## 2024-06-03 RX ADMIN — AER TRAVELER 1 APPLICATION(S): 0.5 SOLUTION RECTAL; TOPICAL at 19:34

## 2024-06-03 RX ADMIN — Medication 2 TABLET(S): at 04:23

## 2024-06-03 RX ADMIN — Medication 600 MILLIGRAM(S): at 19:37

## 2024-06-03 RX ADMIN — Medication 1 APPLICATION(S): at 19:34

## 2024-06-03 RX ADMIN — Medication 600 MILLIGRAM(S): at 20:15

## 2024-06-03 RX ADMIN — Medication 600 MILLIGRAM(S): at 23:43

## 2024-06-03 RX ADMIN — Medication 1 SPRAY(S): at 19:34

## 2024-06-03 RX ADMIN — Medication 2 MILLIUNIT(S)/MIN: at 04:57

## 2024-06-03 RX ADMIN — SODIUM CHLORIDE 3 MILLILITER(S): 9 INJECTION INTRAMUSCULAR; INTRAVENOUS; SUBCUTANEOUS at 22:00

## 2024-06-03 NOTE — OB RN PATIENT PROFILE - SPIRITUAL CULTURAL, CURRENT SITUATION, PROFILE
Department of Anesthesiology  Postprocedure Note    Patient: Delmi Clinton  MRN: [id number]  YOB: 1954  Date of evaluation: 12/5/2022      Procedure Summary     Date: 12/05/22 Room / Location: [facility] / Laurie [facility]    Anesthesia Start: 7651 Anesthesia Stop: 3122    Procedure: EXPLORATORY LAPAROTOMY WITH BOWEL RESECTION, APPENDECTOMY  (Abdomen) Diagnosis:       Partial small bowel obstruction (HCC)      (Partial small bowel obstruction ([address] [K56.600])    Surgeons: Patricia Marrufo MD Responsible Provider: Dejah More DO    Anesthesia Type: general ASA Status: 2          Anesthesia Type: No value filed.     Paige Phase I: Paige Score: 9    Paige Phase II:        Anesthesia Post Evaluation    Patient location during evaluation: PACU  Patient participation: complete - patient participated  Level of consciousness: awake  Airway patency: patent  Nausea & Vomiting: no nausea  Complications: no  Cardiovascular status: hemodynamically stable  Respiratory status: acceptable  Hydration status: stable
[place]

## 2024-06-03 NOTE — OB PROVIDER H&P - NSHPPHYSICALEXAM_GEN_ALL_CORE
Vital Signs Last 24 Hrs  T(C): 36.9 (03 Jun 2024 02:30), Max: 36.9 (03 Jun 2024 02:30)  T(F): 98.5 (03 Jun 2024 02:30), Max: 98.5 (03 Jun 2024 02:30)  HR: 77 (03 Jun 2024 02:41) (77 - 83)  BP: 127/62 (03 Jun 2024 02:41) (127/62 - 127/65)  RR: 17 (03 Jun 2024 02:30) (17 - 18)    Gen: aaox3  Abd: soft, gravid, nontender  EFM: 130/mod/pos accel  [place]: irregular  SVE: 7/90/-2/v/i  BSS: cephalic, post placenta

## 2024-06-03 NOTE — OB PROVIDER H&P - NSHPLABSRESULTS_GEN_ALL_CORE
GCT 89    GBS neg on 5/9/24  B pos, antibody neg  MMRV immune     sonograms  no official sonos on record

## 2024-06-03 NOTE — OB PROVIDER H&P - ASSESSMENT
34yo  at 40w4d, h/o GBS sepsis in P1, h/o shoulder dystocia, AMA, in active labor, intact.   -Admit to L&D  -Admission labs  -Monitor vitals  -Cont EFM/[place]  -Pain management prn  -Clear liquid diet  -Cross 2u   -Ampicillin for GBS prophylaxis     D/w Dr. Lewis and Dr. Davila

## 2024-06-03 NOTE — PROCEDURE NOTE - ADDITIONAL PROCEDURE DETAILS
0730 100ucg fentanyl and 10cc clinician bolus.     0800 Post Top OFF Patient evaluated at bedside.  Hemodynamically stable, degree of motor block appropriate for epidural medication administered. Pain is well controlled bilaterally. Patient is aware that the anesthesia team is available 24/7, in case she needs more pain medication or has any other anesthesia-related needs or questions.     .0625% Bupivacaine +2ucg/cc Fentanyl epidural PCEA 10/5/15 0730 100ucg fentanyl and 10cc clinician bolus.     0800 Post Top OFF Patient evaluated at bedside.  Hemodynamically stable, degree of motor block appropriate for epidural medication administered. Pain is well controlled bilaterally. Patient is aware that the anesthesia team is available 24/7, in case she needs more pain medication or has any other anesthesia-related needs or questions.     .0625% Bupivacaine +2ucg/cc Fentanyl epidural PCEA 10/5/15    Post Labor  Epidural/ Delivery  Evaluation Note:    Uncomplicated anesthetic for Vaginal Delivery.    Patient seen at bedside. Epidural to be removed by RN before patient transfer.  Patient moving B/L lower extremities.  Motor block appropriate and resolving. Vital Signs are stable. Pain well controlled.     Celestino Score greater than 9    Mental Status:  __x__ Awake   ___x__ Alert   _____ Drowsy   _____ Sedated    Nausea/Vomiting:  __x__ NO  ______Yes,   See Post - Op Orders          Pain Scale (0-10):  _____    Treatment: __X__ None       Plan: Discharge:   ____Home       __X___Floor     _____Critical Care    _____

## 2024-06-03 NOTE — OB PROVIDER H&P - SOCIAL HISTORY
14 y.o female presents for [place] I and Bilateral Sagittal Split osteotomies in the OR w/ Dr. Kong. No

## 2024-06-03 NOTE — OB RN PATIENT PROFILE - FALL HARM RISK - UNIVERSAL INTERVENTIONS
Bed in lowest position, wheels locked, appropriate side rails in place/Call bell, personal items and telephone in reach/Instruct patient to call for assistance before getting out of bed or chair/Non-slip footwear when patient is out of bed/[place] to call system/Physically safe environment - no spills, clutter or unnecessary equipment/Purposeful Proactive Rounding/Room/bathroom lighting operational, light cord in reach

## 2024-06-03 NOTE — OB RN PATIENT PROFILE - WEIGHT IN LBS
S/P successful cardioversion with Dr. Gene Tay. VSS. EKG done. 40Mg of Brevital given. Pt leonora procedure well. Able to eat and drink. DC instructions reviewed. Pt ambulated well.  IV DC'd and pt DC home in stable condition 223.1

## 2024-06-03 NOTE — OB PROVIDER H&P - NS_OBGYNHISTORY_OBGYN_ALL_OB_FT
ObHx:   h/o 4 FT , largest 9-14    GynHx: patient denies h/o fibroids, ovarian cysts, abnormal PAPs, STIs

## 2024-06-03 NOTE — OB RN TRIAGE NOTE - FALL HARM RISK - UNIVERSAL INTERVENTIONS
Bed in lowest position, wheels locked, appropriate side rails in place/Call bell, personal items and telephone in reach/Instruct patient to call for assistance before getting out of bed or chair/Non-slip footwear when patient is out of bed/[place] to call system/Physically safe environment - no spills, clutter or unnecessary equipment/Purposeful Proactive Rounding/Room/bathroom lighting operational, light cord in reach

## 2024-06-03 NOTE — OB RN PATIENT PROFILE - COMFORT/ACCEPTABLE PAIN LEVEL (0-10)
4 Graft Donor Site Bandage (Optional-Leave Blank If You Don't Want In Note): Steri-strips and a pressure bandage were applied to the donor site.

## 2024-06-03 NOTE — OB PROVIDER DELIVERY SUMMARY - NSPROVIDERDELIVERYNOTE_OBGYN_ALL_OB_FT
pt delivered a viable male infant over 1st degree laceration   placenta delivered intact and spont   pt stable   mild lochia   uterus  firm   1st degree laceration repaired with 3-0 chromic suture baby to reg nursery  peds preset fro delivery for meconium

## 2024-06-03 NOTE — OB PROVIDER H&P - HISTORY OF PRESENT ILLNESS
34yo  at 40w4d, ALEX 24 by first tri sono, presents to labor and delivery with contractions for 3 hours, now q8-10 min and 6/10 in intensity. Denies lof, vb, endorses good fm. H/o GBS sepsis of  in P1, baby admitted to NICU for 2 weeks after delivery. Largest baby was 9-14, shoulder dystocia with delivery. Denies other complications. GBS negative in this pregnancy.

## 2024-06-03 NOTE — PROCEDURE NOTE - NSCOMPLICATION_GEN_A_CORE
no complications
Pt is 61 yo female with pmhx of sciatica, HTN who presents with lower back pain that began suddenly on Saturday when she was bending down to get something.  Pt reports she felt pain across entire lower back with tightness.  Pt denies any weakness, bowel or bladder dysfunction, fevers, urinary symptoms, n/v/d, abdominal pain, numbness, or difficulty walking or bearing weight.  Pt reports taking acetaminophen with improvement of pain. Pt denies any falls or direct back trauma.

## 2024-06-03 NOTE — OB RN PATIENT PROFILE - NS PRO PT RIGHT SUPPORT PERSON
0901-  Patient arrived to Phase II via cart. Spontaneous respirations even and unlabored. Placed on monitor--VSS. Report received from [facility].    6108-  Assessment completed. Patient is alert and oriented x4. IV capped off-- no complications. Injection sites clean and dry. Patient denies pain-- will monitor. 1608-  Tea and spouse brought to room. 1576-  Belongings brought to patient. 0915-  IV removed-- no complications. 2915-  Patient dressed. 2732-  Discharge instructions given. Understanding verbalized. 5964-  Patient discharged in stable condition with all belongings.
Resting quietly in recliner with feet elevated and call light in reach.
Declines

## 2024-06-03 NOTE — OB PROVIDER H&P - NSLOWPPHRISK_OBGYN_A_OB
No previous uterine incision/[place] Pregnancy/Less than or equal to 4 previous vaginal births/No known bleeding disorder/No history of postpartum hemorrhage

## 2024-06-03 NOTE — OB RN PATIENT PROFILE - TEMPERATURE IN CELSIUS (DEGREES C)
Pt arrives with CC of urinary retention x 12 hours and diarrhea x 2-4 days.     No history of retention, pt wears O2 at baseline
36.9

## 2024-06-03 NOTE — OB RN DELIVERY SUMMARY - NS_SEPSISRSKCALC_OBGYN_ALL_OB_FT
EOS calculated successfully. EOS Risk Factor: 0.5/1000 live births ([facility] national incidence); GA=40w4d; Temp=98.5; ROM=2.467; GBS='Positive'; Antibiotics='Broad spectrum antibiotics > 4 hrs prior to birth'

## 2024-06-03 NOTE — OB RN PATIENT PROFILE - NSSDOHPUBLICBEN_OBGYN_A_OB
[facility]  Tila Costello MD  [phone]      Ean Comber  1949    66 y.o. male     Date of Visit: 11/22/2021    Chief Complaint: rash and skin lesions    History of Present Illness:    1. He complains of a pruritic eruption on the right chest and right thigh. 2.  F/u for AKs - treated the scalp and upper forehead with Efudex since last visit with improvement. 3.  Has stable pigmented lesions on the extremities. 4.  He reports a lesion on the left side of the neck. 5.  Has hx of numerous BCCs -     Superficial basal cell carcinoma of the left upper chest-treated with curettage on 3/5/2021. Nodular BCC left anterior shoulder-treated with electrodesiccation and curettage on 3/5/2021. Metatypical BCC on the right forehead-treated with Mohs by Dr. Jay Reaves on 1/27/2021.     Small nodular BCC on the right superior shoulder-treated with curettage at the time of biopsy on 11/17/2020. Nodular BCC of the right mid vertex scalp-treated with Mohs by Dr. Jay Reaves on 3/11/2020.     Small nodular BCCs on the right anterior upper arm and right mid arm-treated with curettage at the time of biopsy on 2/26/2019.     Nodular BCC on the left upper chest history with curettage at the time of biopsy in October 2018. Superficial BCC of the right supraclavicular region-treated with curettage at time of biopsy on 1/3/2018.     Nodular BCC on the right thigh - excised on 6/23/17.    Superficial BCC on the right central upper back - treated with curettage on 3/28/17. Nodular BCC on the left central lower back - treated with curettage on 3/28/17.     Nodular BCC on the right mid thigh-treated with curettage on 10/7/2016. Superficial basal cell carcinoma left upper chest-treated with curettage on 9/21/2016. Nodular BCC of the right mid central back-treated with curettage on 9/21/2016.   Superficial BCC of the left central lower back-treated with curettage on 3/3/2016.     4 BCCs treated with ED&C on 9/2015.    Nodular BCC on the right lateral shoulder, superior  Nodular basal cell carcinoma on the right lateral shoulder, inferior  Superficial basal cell carcinoma of the right upper back  Superficial basal cell carcinoma of the central lower back      Bowen's disease of the left temple-treated with Efudex cream b.i.d. for 4 weeks.    Superficial BCC, right posterior lateral arm-treated with curettage 3/7/14. BCC, right upper arm-excised 3/7/2014. BCC on the nasal dorsum (biopsy 10/2013) status post Mohs micrographic surgery in December 2013. Review of Systems:  Gen: Feels well, good sense of health. Skin: No new or changing moles. Past Medical History, Family History, Surgical History, Medications and Allergies reviewed. Past Medical History:   Diagnosis Date    Anxiety     BPH (benign prostatic hyperplasia) 5/31/2012    CAD (coronary artery disease)     Cancer ([facility] [address]     SKIN    Hyperlipidemia     LBBB (left bundle branch block) 5/31/2012    Primary insomnia 10/31/2019    Sleep apnea 2011    USE CPAP     Past Surgical History:   Procedure Laterality Date    ANKLE FRACTURE SURGERY      ANKLE SURGERY Left 10/1/2020    REMOVAL OF HARDWARE LEFT ANKLE performed by Richie Lange MD at [address] [facility]  2006    MOHS SURGERY Right 03/11/2020    Right Mid Vertex Scalp    MOHS SURGERY  01/27/2021    R Forehead    TOTAL ANKLE ARTHROPLASTY Left 10/1/2020    LEFT ANKLE REPLACEMENT, GASTROCNEMIUS RECESSION; performed by Richie Lange MD at [address]       No Known Allergies  Outpatient Medications Marked as Taking for the 11/22/21 encounter (Office Visit) with Santhosh Carolina MD   Medication Sig Dispense Refill    triamcinolone (KENALOG) 0.1 % cream Apply to affected aresa on the right chest and right thigh twice daily for up to 2 weeks or until improved.  30 g 2    LORazepam (ATIVAN) 0.5 MG tablet Take 1 tablet by mouth every 8 hours as needed for Anxiety for up to 90 days. 270 tablet 0    zolpidem (AMBIEN CR) 12.5 MG extended release tablet TAKE ONE TABLET BY MOUTH NIGHTLY AS NEEDED FOR SLEEP- KAMARI 90 tablet 3    lisinopril (PRINIVIL;ZESTRIL) 10 MG tablet TAKE 1 TABLET BY MOUTH EVERY MORNING      metFORMIN (GLUCOPHAGE-XR) 500 MG extended release tablet TAKE 1 TABLET BY MOUTH DAILY WITH BREAKFAST 90 tablet 3    rosuvastatin (CRESTOR) 20 MG tablet TAKE 1 TABLET BY MOUTH  DAILY 90 tablet 3    clopidogrel (PLAVIX) 75 MG tablet TAKE 1 TABLET BY MOUTH  DAILY 90 tablet 3    tamsulosin (FLOMAX) 0.4 MG capsule TAKE 1 CAPSULE BY MOUTH  DAILY 90 capsule 3    olmesartan (BENICAR) 20 MG tablet Take 1 tablet by mouth      omeprazole (PRILOSEC) 20 MG delayed release capsule TAKE 1 CAPSULE BY MOUTH  DAILY (Patient taking differently: Take 20 mg by mouth as needed ) 90 capsule 3    fluorouracil (EFUDEX) 5 % cream Apply to the forehead, temples and sides of the cheeks 2 times daily for 14 days. 40 g 0    JUBLIA 10 % SOLN APPLY TO THE RIGHT BIG TONAIL AND 4TH TOENAIL ONCE A DAY AFTER BATHING 4 mL 0    Vitamin D (CHOLECALCIFEROL) 1000 UNITS CAPS capsule Take 1,000 Units by mouth daily.  aspirin 81 MG EC tablet Take 81 mg by mouth M,W,F only           Physical Examination       The following were examined and determined to be normal: Psych/Neuro, Scalp/hair, Conjunctivae/eyelids, Gums/teeth/lips, Neck, Abdomen, Back, LUE, LLE and Nails/digits. The following were examined and determined to be abnormal: Head/face, Breast/axilla/chest, RUE and RLE. Well appearing. 1.  Right upper chest and right thigh - ill defined excoriated scaly pink macules and papules. 2.  Right distal extensor forearm - 1 keratotic pink macule. Upper forehead near the hairline - few skin colored scaly macules. 3.  Extensor extremities with multiple round smooth brown macules and patches. 4.  Left lateral neck - stuck on appearing verrucous yellowish papule.      5. Clear.          Assessment and Plan     1. Irritant dermatitis of the chest and right thigh    Triamcinolone 0.1% cream twice daily for up to 2 weeks or until improved. 2. AK (actinic keratosis)     2 cycles of liquid nitrogen applied to 1 AK on the right forearm. Patient was educated regarding the potential risks of blister formation and discomfort. Wound care was discussed. Efudex cream twice daily to the few remaining lesions on upper forehead for 14 days. We discussed the likely side effects of treatment including redness, crusting, itching and burning. 3. Solar lentigines    Monitor for change. 4. SK (seborrheic keratosis)     Reassurance. 5. History of basal cell carcinomas - clear    Sun protective behaviors, including use of at least SPF 30 sunscreen, and self skin examinations were encouraged. Call for any new or concerning lesions. Return in about 3 months (around 2/22/2022).     --Aliza Reyes MD I do not need help with these

## 2024-06-04 ENCOUNTER — TRANSCRIPTION ENCOUNTER (OUTPATIENT)
Age: 34
End: 2024-06-04

## 2024-06-04 ENCOUNTER — APPOINTMENT (OUTPATIENT)
Dept: OBGYN | Facility: CLINIC | Age: 34
End: 2024-06-04

## 2024-06-04 VITALS
RESPIRATION RATE: 18 BRPM | OXYGEN SATURATION: 98 % | HEART RATE: 70 BPM | SYSTOLIC BLOOD PRESSURE: 110 MMHG | DIASTOLIC BLOOD PRESSURE: 58 MMHG | TEMPERATURE: 97 F

## 2024-06-04 LAB
BASOPHILS # BLD AUTO: 0.03 K/UL — SIGNIFICANT CHANGE UP (ref 0–0.2)
BASOPHILS NFR BLD AUTO: 0.4 % — SIGNIFICANT CHANGE UP (ref 0–1)
EOSINOPHIL # BLD AUTO: 0.08 K/UL — SIGNIFICANT CHANGE UP (ref 0–0.7)
EOSINOPHIL NFR BLD AUTO: 1 % — SIGNIFICANT CHANGE UP (ref 0–8)
HCT VFR BLD CALC: 28 % — LOW (ref 37–47)
HGB BLD-MCNC: 9 G/DL — LOW (ref 12–16)
IMM GRANULOCYTES NFR BLD AUTO: 0.8 % — HIGH (ref 0.1–0.3)
LYMPHOCYTES # BLD AUTO: 1.94 K/UL — SIGNIFICANT CHANGE UP (ref 1.2–3.4)
LYMPHOCYTES # BLD AUTO: 23.3 % — SIGNIFICANT CHANGE UP (ref 20.5–51.1)
MCHC RBC-ENTMCNC: 26.9 PG — LOW (ref 27–31)
MCHC RBC-ENTMCNC: 32.1 G/DL — SIGNIFICANT CHANGE UP (ref 32–37)
MCV RBC AUTO: 83.6 FL — SIGNIFICANT CHANGE UP (ref 81–99)
MONOCYTES # BLD AUTO: 0.44 K/UL — SIGNIFICANT CHANGE UP (ref 0.1–0.6)
MONOCYTES NFR BLD AUTO: 5.3 % — SIGNIFICANT CHANGE UP (ref 1.7–9.3)
NEUTROPHILS # BLD AUTO: 5.78 K/UL — SIGNIFICANT CHANGE UP (ref 1.4–6.5)
NEUTROPHILS NFR BLD AUTO: 69.2 % — SIGNIFICANT CHANGE UP (ref 42.2–75.2)
NRBC # BLD: 0 /100 WBCS — SIGNIFICANT CHANGE UP (ref 0–0)
PLATELET # BLD AUTO: 117 K/UL — LOW (ref 130–400)
PMV BLD: 10.5 FL — HIGH (ref 7.4–10.4)
RBC # BLD: 3.35 M/UL — LOW (ref 4.2–5.4)
RBC # FLD: 15.2 % — HIGH (ref 11.5–14.5)
WBC # BLD: 8.34 K/UL — SIGNIFICANT CHANGE UP (ref 4.8–10.8)
WBC # FLD AUTO: 8.34 K/UL — SIGNIFICANT CHANGE UP (ref 4.8–10.8)

## 2024-06-04 RX ORDER — CALCIUM CARBONATE 500(1250)
1 TABLET ORAL DAILY
Refills: 0 | Status: DISCONTINUED | OUTPATIENT
Start: 2024-06-04 | End: 2024-06-04

## 2024-06-04 RX ADMIN — Medication 600 MILLIGRAM(S): at 11:30

## 2024-06-04 RX ADMIN — SODIUM CHLORIDE 3 MILLILITER(S): 9 INJECTION INTRAMUSCULAR; INTRAVENOUS; SUBCUTANEOUS at 06:00

## 2024-06-04 RX ADMIN — Medication 600 MILLIGRAM(S): at 00:15

## 2024-06-04 RX ADMIN — Medication 1 TABLET(S): at 14:34

## 2024-06-04 NOTE — DISCHARGE NOTE OB - CARE PROVIDERS DIRECT ADDRESSES
[email or web address]

## 2024-06-04 NOTE — DISCHARGE NOTE OB - CARE PROVIDER_API CALL
Sheila Larsen  Obstetrics and Gynecology  [address] [place] [address]  Phone: [phone]  Fax: [phone]  Follow Up Time:

## 2024-06-04 NOTE — DISCHARGE NOTE OB - PATIENT PORTAL LINK FT
You can access the FollowMyHealth Patient Portal offered by [facility] by registering at the following website: [email or web address]. By joining [facility]’s FollowMyHealth portal, you will also be able to view your health information using other applications (apps) compatible with our system.

## 2024-06-04 NOTE — DISCHARGE NOTE OB - NS MD DC FALL RISK RISK
For information on Fall & Injury Prevention, visit: [email or web address] OR  [email or web address] OR  https://www.cdc.gov/steadi/patient.html

## 2024-06-04 NOTE — PROGRESS NOTE ADULT - SUBJECTIVE AND OBJECTIVE BOX
Pt doing well, pain well controlled. Denies heavy vaginal bleeding. No overnight events, no acute complaints. Ambulating without difficulty, voiding, and tolerating regular diet. Breastfeeding.    PAST MEDICAL & SURGICAL HISTORY:  No pertinent past medical history      No significant past surgical history          Physical Exam  Vital Signs Last 24 Hrs  T(F): 97.9 (04 Jun 2024 07:56), Max: 98.4 (03 Jun 2024 23:36)  HR: 79 (04 Jun 2024 07:56) (71 - 82)  BP: 110/54 (04 Jun 2024 07:56) (98/54 - 127/86)  RR: 18 (04 Jun 2024 07:56) (18 - 18)    Gen: AAOx3, NAD  Abd: Soft, nontender, nondistended  Fundus: Firm, nontender, below the umbilicus  Lochia: Minimal  Ext: No calf tenderness, no swelling    Labs:                        9.0    8.34  )-----------( 117      ( 04 Jun 2024 06:52 )             28.0                         9.9    9.78  )-----------( 152      ( 03 Jun 2024 03:00 )             30.3

## 2024-06-11 DIAGNOSIS — O48.0 POST-TERM PREGNANCY: ICD-10-CM

## 2024-06-11 DIAGNOSIS — Z3A.40 40 WEEKS GESTATION OF PREGNANCY: ICD-10-CM

## 2024-07-23 ENCOUNTER — APPOINTMENT (OUTPATIENT)
Dept: OBGYN | Facility: CLINIC | Age: 34
End: 2024-07-23
Payer: MEDICAID

## 2024-07-23 VITALS — DIASTOLIC BLOOD PRESSURE: 70 MMHG | BODY MASS INDEX: 34.7 KG/M2 | SYSTOLIC BLOOD PRESSURE: 103 MMHG | WEIGHT: 215 LBS

## 2024-07-23 PROCEDURE — 0503F POSTPARTUM CARE VISIT: CPT

## 2024-07-23 NOTE — HISTORY OF PRESENT ILLNESS
[Postpartum Follow Up] : postpartum follow up [Complications:___] : no complications [Delivery Date: ___] : on [unfilled] [Breastfeeding] : currently nursing [BF with Difficulty] : nursing without difficulty [de-identified] : lower pelvic pressure [FreeTextEntry1] : pt is confused as to bc, she feels she should have more children she has a heter from a paula to use bc , she wants to try the "natural method" - once she starts having periods. discussed with her the lower pelvic pressure and vaginal pressure 2nd to lack of abd muscle strength large babies close childbearing heavy lifting  advised pt to use bc like birgit , or iud  she brought iud right away today - then i said better to come without kids she had 2 with her , then in the office stated she doesnt want any bc?? pt will discuss with  in the meantime try to lose weight to do Kuegel exercise with weights, less lifting and core exercises

## 2024-09-30 NOTE — OB RN TRIAGE NOTE - PT NEEDS ASSIST
[Dear  ___] : Dear  [unfilled], [Courtesy Letter:] : I had the pleasure of seeing your patient, [unfilled], in my office today. [Please see my note below.] : Please see my note below. [Consult Closing:] : Thank you very much for allowing me to participate in the care of this patient.  If you have any questions, please do not hesitate to contact me. [Sincerely,] : Sincerely, [FreeTextEntry3] : Mckay Jasso MD no

## 2024-10-24 NOTE — DISCHARGE NOTE OB - BREAST MILK SUPPORTS STABLE NEWBORN BLOOD SUGAR
Identify and utilize 2 coping skills that meet their needs Statement Selected Identify and utilize 2 coping skills that meet their needs

## 2024-11-06 NOTE — DISCHARGE NOTE OB - SWOLLEN, PAINFUL HOT AREAS AND/OR STREAKS ON THE BREAST
All very common during recovery. Advise vocal rest and avoiding straining. Eating/drinking foods that are low in acid/not spicy/hot.  
Called patient and informed him of what antonio said. Patient verbalized understanding and thanked me.    
Patient called in and said he had surgery on 11/04 with Dr. Saucedo on Monday and he said his taste is gone. He had DIRECT MICRO LARYNGOSCOPY WITH BX with Mark Saucedo MD. Patient would like to know if this is common or is there something he needs to do. His voice is very hoarse and soft. Please advise   
Statement Selected

## 2024-11-19 NOTE — OB RN PATIENT PROFILE - PARENTS VERBALIZED UNDERSTANDING OF THE SAFE SKIN TO SKIN POSITIONING OF THE NEWBORN.
Acute onset of fever associate with cough x 1 day.  Responding to antipyretics.  Decreased p.o. intake, drink some and has had 3 wet diapers today minimal output such as vomiting or diarrhea, no rash.  Patient attends school.  On exam tired appearing but nontoxic, diminished aeration on the right without crackles or wheezing, no respiratory distress warm well-perfused with tachycardia but regular rate and no signs of MRG.  Differential includes viral syndrome, pneumonia given right-sided findings.  Chest x-ray performed which showed  -JERICHO Smalls Attending Statement Selected Acute onset of fever associate with cough x 1 day.  Responding to antipyretics.  Decreased p.o. intake, drink some and has had 3 wet diapers today minimal output such as vomiting or diarrhea, no rash.  Patient attends school.  On exam tired appearing but nontoxic, diminished aeration on the right without crackles or wheezing, no respiratory distress warm well-perfused with tachycardia but regular rate and no signs of MRG.  Differential includes viral syndrome, pneumonia given right-sided findings.  Chest x-ray performed which showed Right sided pneumonia as read by me.  Patient prescribed amoxicillin.  Discussed hydration, supportive care for fever, return precautions.  Parents at bedside contributing to history and shared decision making. JERICHO Davis Attending

## 2025-02-03 NOTE — OB RN DELIVERY SUMMARY - BABY A: APGAR 1 MIN RESP RATE, DELIVERY
Physical Therapy Progress Note    Visit Type: Progress Note  Visit: 8  Referring Provider: Evelyn Cook DO  Medical Diagnosis (from order): M62.81 - Muscle weakness     SUBJECTIVE                                                                                                               Patient reports:  - Waking up 3-4x per night recently with baby due to his recent respiratory illness  - Pt reports some discomfort @ the pubic symphysis joint with fast walking  - Denies urinary leakage  - Mild increase in urinary urgency but able to hold 15-30 mins. Usually may be due to increased pressure from child sitting on bladder or with intake of carbonated beverages.  - Patient urinating 0-1x per night  - Continues to try and increase her fluids  Current Functional Limitations: See above      OBJECTIVE                                                                                                                    Observation   Leg length:  Increased stiffness R femoroacetabular joint   Palpation  Increased trigger points in abhijit hip adductors; L>R   Special Tests  Active SLR: (+) abhijit         Outcome/Assessments  Patient Specific Functional Scale:   Activity: Tolerate 30 mins walk without pelvic symptoms, Score: 9  Activity: Fast walking without pelvic pain (NEW), Score: 7  Average Score: 8  Each activity is scored: 0=unable to perform activity to 10=able to perform activity at the same level as before injury or problem      Treatment     Therapeutic Exercise  [employer] bike x5 mins for warm-up during the subjective intake. Intensity 5    Test and measures  Core strengthening  - Abdominal hallowing in hooklying x15  - Abdominal hallowing with hooklying march x15; performed on each side  - Abdominal hallowing with alt heel slides x15; performed on each side  - Abdominal hallowing with SLR x15; performed on each side  - Abdominal hallowing with bridge with adductor squeeze x15    Skilled input: as detailed above    Writer  verbally educated and received verbal consent for hand placement, positioning of patient, and techniques to be performed today from patient for clothing adjustments for techniques, hand placement and palpation for techniques and therapist position for techniques as described above and how they are pertinent to the patient's plan of care.  Home Exercise Program  [employer]      ASSESSMENT                                                                                                            Pt presents with decreased lumbopelvic control as noticed with ASLR. She demonstrated good understanding of performing her core exercises this visit. Pt has had slow progress in her rehabilitation secondary to being the primary parent responsible for the childcare needs as she continues with significant sleep disruption and limited time to perform a strengthening program. Skilled PT continues to be indicated to address her generalized weakness postpartum.   Education:   - Results of above outlined education: Verbalizes understanding, Needs reinforcement and Demonstrates understanding    PLAN                                                                                                                          Continue interventions established at initial evalution. and Extend frequency and duration per below.  Frequency / Duration  1 times per week tapering as patient progresses for 8 weeks for an estimated total of 6 visits    Suggestions for next session as indicated: Progress per plan of care       Therapy procedure time and total treatment time can be found documented on the Time Entry flowsheet     (2) good, crying

## 2025-02-04 NOTE — OB RN DELIVERY SUMMARY - NS_SPECIMENS_OBGYN_ALL_OB
Spoke with pt provided arrival time of 9:30 for sx on 2/7/25 with Dr. Mcnair @ [facility]. Pt has eyedrops and packet.   No

## 2025-03-18 ENCOUNTER — APPOINTMENT (OUTPATIENT)
Dept: OBGYN | Facility: CLINIC | Age: 35
End: 2025-03-18
Payer: MEDICAID

## 2025-03-18 ENCOUNTER — LABORATORY RESULT (OUTPATIENT)
Age: 35
End: 2025-03-18

## 2025-03-18 ENCOUNTER — RESULT CHARGE (OUTPATIENT)
Age: 35
End: 2025-03-18

## 2025-03-18 ENCOUNTER — NON-APPOINTMENT (OUTPATIENT)
Age: 35
End: 2025-03-18

## 2025-03-18 VITALS
HEART RATE: 81 BPM | BODY MASS INDEX: 33.9 KG/M2 | WEIGHT: 210 LBS | SYSTOLIC BLOOD PRESSURE: 114 MMHG | DIASTOLIC BLOOD PRESSURE: 71 MMHG

## 2025-03-18 DIAGNOSIS — Z30.09 ENCOUNTER FOR OTHER GENERAL COUNSELING AND ADVICE ON CONTRACEPTION: ICD-10-CM

## 2025-03-18 DIAGNOSIS — Z01.419 ENCOUNTER FOR GYNECOLOGICAL EXAMINATION (GENERAL) (ROUTINE) W/OUT ABNORMAL FINDINGS: ICD-10-CM

## 2025-03-18 DIAGNOSIS — O26.843 UTERINE SIZE-DATE DISCREPANCY, THIRD TRIMESTER: ICD-10-CM

## 2025-03-18 DIAGNOSIS — Z32.01 ENCOUNTER FOR PREGNANCY TEST, RESULT POSITIVE: ICD-10-CM

## 2025-03-18 DIAGNOSIS — O99.019 ANEMIA COMPLICATING PREGNANCY, UNSPECIFIED TRIMESTER: ICD-10-CM

## 2025-03-18 LAB
BILIRUB UR QL STRIP: NORMAL
GLUCOSE UR-MCNC: NORMAL
HCG UR QL: NORMAL EU/DL
HCG UR QL: POSITIVE
HGB UR QL STRIP.AUTO: NORMAL
KETONES UR-MCNC: NORMAL
LEUKOCYTE ESTERASE UR QL STRIP: NORMAL
NITRITE UR QL STRIP: NORMAL
PH UR STRIP: 5.5
PROT UR STRIP-MCNC: NORMAL
QUALITY CONTROL: YES
SP GR UR STRIP: 1.03

## 2025-03-18 PROCEDURE — 99395 PREV VISIT EST AGE 18-39: CPT

## 2025-03-18 PROCEDURE — 99213 OFFICE O/P EST LOW 20 MIN: CPT | Mod: 25

## 2025-03-18 PROCEDURE — 76817 TRANSVAGINAL US OBSTETRIC: CPT

## 2025-03-18 PROCEDURE — 99459 PELVIC EXAMINATION: CPT

## 2025-03-18 PROCEDURE — 81003 URINALYSIS AUTO W/O SCOPE: CPT | Mod: QW

## 2025-03-18 PROCEDURE — 81025 URINE PREGNANCY TEST: CPT

## 2025-03-19 LAB
ABORH: NORMAL
ANTIBODY SCREEN: NORMAL
B19V IGG SER QL IA: 4.38 INDEX
B19V IGG+IGM SER-IMP: NORMAL
B19V IGG+IGM SER-IMP: POSITIVE
B19V IGM FLD-ACNC: 0.35 INDEX
B19V IGM SER-ACNC: NEGATIVE
BASOPHILS # BLD AUTO: 0.03 K/UL
BASOPHILS NFR BLD AUTO: 0.5 %
C TRACH RRNA SPEC QL NAA+PROBE: NOT DETECTED
EOSINOPHIL # BLD AUTO: 0.03 K/UL
EOSINOPHIL NFR BLD AUTO: 0.5 %
HCT VFR BLD CALC: 35 %
HGB BLD-MCNC: 11 G/DL
HIV1+2 AB SPEC QL IA.RAPID: NONREACTIVE
IMM GRANULOCYTES NFR BLD AUTO: 0.3 %
LYMPHOCYTES # BLD AUTO: 1.54 K/UL
LYMPHOCYTES NFR BLD AUTO: 24.7 %
MAN DIFF?: NORMAL
MCHC RBC-ENTMCNC: 27.8 PG
MCHC RBC-ENTMCNC: 31.4 G/DL
MCV RBC AUTO: 88.6 FL
MEV IGG FLD QL IA: >300 AU/ML
MEV IGG+IGM SER-IMP: POSITIVE
MONOCYTES # BLD AUTO: 0.4 K/UL
MONOCYTES NFR BLD AUTO: 6.4 %
MUV AB SER-ACNC: POSITIVE
MUV IGG SER QL IA: 63.7 AU/ML
N GONORRHOEA RRNA SPEC QL NAA+PROBE: NOT DETECTED
NEUTROPHILS # BLD AUTO: 4.21 K/UL
NEUTROPHILS NFR BLD AUTO: 67.6 %
PLATELET # BLD AUTO: 189 K/UL
RBC # BLD: 3.95 M/UL
RBC # FLD: 14.8 %
RUBV IGG FLD-ACNC: 3.68 INDEX
RUBV IGG SER-IMP: POSITIVE
SOURCE AMPLIFICATION: NORMAL
T PALLIDUM AB SER QL IA: NEGATIVE
VZV AB TITR SER: POSITIVE
VZV IGG SER IF-ACNC: 47.5 S/CO
WBC # FLD AUTO: 6.23 K/UL

## 2025-03-20 LAB
BACTERIA UR CULT: ABNORMAL
HBV SURFACE AG SER QL: NONREACTIVE
HCV AB SER QL: ABNORMAL
HCV S/CO RATIO: 0.88 S/CO
LEAD BLD-MCNC: <1 UG/DL

## 2025-04-09 NOTE — DISCHARGE NOTE OB - NS AS DC STROKE DX YN
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  Imaging showed gallstone ileus with 2.2 cm gallstone impacted.  Underwent surgery on 3/30.  Postoperatively had issues with ileus, fever, and aflutter with RVR.     -- On 4/8 had worsening abdominal pain.  Returned back to the OR for exploratory laparotomy with small bowel resection.  Postprocedure patient admitted to ICU -intubated, on pressor support    4/9 - patient continues to be intubated, on pressor support.  Will defer care to intensivist team/surgical team.  -- Hospitalist team will  sign off, please re consult us once patient stable for transfer.  Please feel free to call for questions.    Discussed with intensivist team.     no

## 2025-04-28 ENCOUNTER — NON-APPOINTMENT (OUTPATIENT)
Age: 35
End: 2025-04-28

## 2025-04-29 ENCOUNTER — APPOINTMENT (OUTPATIENT)
Dept: OBGYN | Facility: CLINIC | Age: 35
End: 2025-04-29
Payer: MEDICAID

## 2025-04-29 VITALS
BODY MASS INDEX: 34.55 KG/M2 | DIASTOLIC BLOOD PRESSURE: 47 MMHG | SYSTOLIC BLOOD PRESSURE: 91 MMHG | HEART RATE: 90 BPM | WEIGHT: 215 LBS | HEIGHT: 66 IN

## 2025-04-29 PROCEDURE — 81003 URINALYSIS AUTO W/O SCOPE: CPT | Mod: QW

## 2025-04-29 PROCEDURE — 0501F PRENATAL FLOW SHEET: CPT

## 2025-05-01 LAB — BACTERIA UR CULT: NORMAL

## 2025-05-29 ENCOUNTER — APPOINTMENT (OUTPATIENT)
Dept: OBGYN | Facility: CLINIC | Age: 35
End: 2025-05-29
Payer: MEDICAID

## 2025-05-29 VITALS
SYSTOLIC BLOOD PRESSURE: 100 MMHG | BODY MASS INDEX: 34.54 KG/M2 | DIASTOLIC BLOOD PRESSURE: 60 MMHG | WEIGHT: 214 LBS | HEART RATE: 84 BPM

## 2025-05-29 DIAGNOSIS — Z34.90 ENCOUNTER FOR SUPERVISION OF NORMAL PREGNANCY, UNSPECIFIED, UNSPECIFIED TRIMESTER: ICD-10-CM

## 2025-05-29 LAB
BILIRUB UR QL STRIP: NORMAL
GLUCOSE UR-MCNC: NORMAL
HCG UR QL: 0.2 EU/DL
HGB UR QL STRIP.AUTO: NORMAL
KETONES UR-MCNC: NORMAL
LEUKOCYTE ESTERASE UR QL STRIP: NORMAL
NITRITE UR QL STRIP: NORMAL
PH UR STRIP: 6.5
PROT UR STRIP-MCNC: NORMAL
SP GR UR STRIP: 1.02

## 2025-05-29 PROCEDURE — 81003 URINALYSIS AUTO W/O SCOPE: CPT | Mod: QW

## 2025-05-29 PROCEDURE — 0502F SUBSEQUENT PRENATAL CARE: CPT

## 2025-06-24 ENCOUNTER — APPOINTMENT (OUTPATIENT)
Dept: OBGYN | Facility: CLINIC | Age: 35
End: 2025-06-24
Payer: MEDICAID

## 2025-06-24 VITALS
WEIGHT: 219 LBS | DIASTOLIC BLOOD PRESSURE: 62 MMHG | BODY MASS INDEX: 35.2 KG/M2 | HEIGHT: 66 IN | SYSTOLIC BLOOD PRESSURE: 105 MMHG | HEART RATE: 90 BPM

## 2025-06-24 PROCEDURE — 0502F SUBSEQUENT PRENATAL CARE: CPT

## 2025-06-24 PROCEDURE — 81003 URINALYSIS AUTO W/O SCOPE: CPT | Mod: QW

## 2025-07-21 ENCOUNTER — APPOINTMENT (OUTPATIENT)
Dept: OBGYN | Facility: CLINIC | Age: 35
End: 2025-07-21
Payer: MEDICAID

## 2025-07-21 VITALS
DIASTOLIC BLOOD PRESSURE: 66 MMHG | SYSTOLIC BLOOD PRESSURE: 106 MMHG | HEART RATE: 99 BPM | BODY MASS INDEX: 35.52 KG/M2 | HEIGHT: 66 IN | WEIGHT: 221 LBS

## 2025-07-21 DIAGNOSIS — Z34.90 ENCOUNTER FOR SUPERVISION OF NORMAL PREGNANCY, UNSPECIFIED, UNSPECIFIED TRIMESTER: ICD-10-CM

## 2025-07-21 LAB
BILIRUB UR QL STRIP: NORMAL
GLUCOSE UR-MCNC: NORMAL
HCG UR QL: 0.2 EU/DL
HGB UR QL STRIP.AUTO: NORMAL
KETONES UR-MCNC: NORMAL
LEUKOCYTE ESTERASE UR QL STRIP: NORMAL
NITRITE UR QL STRIP: NORMAL
PH UR STRIP: 6
PROT UR STRIP-MCNC: 100
SP GR UR STRIP: 1.02

## 2025-07-21 PROCEDURE — 0502F SUBSEQUENT PRENATAL CARE: CPT

## 2025-07-21 PROCEDURE — 81003 URINALYSIS AUTO W/O SCOPE: CPT | Mod: QW

## 2025-07-22 LAB — GLUCOSE 1H P 50 G GLC PO SERPL-MCNC: 100 MG/DL

## 2025-07-23 DIAGNOSIS — D64.9 ANEMIA, UNSPECIFIED: ICD-10-CM

## 2025-07-23 LAB
BASOPHILS # BLD AUTO: 0.02 K/UL
BASOPHILS NFR BLD AUTO: 0.3 %
EOSINOPHIL # BLD AUTO: 0.02 K/UL
EOSINOPHIL NFR BLD AUTO: 0.3 %
HCT VFR BLD CALC: 30.7 %
HGB BLD-MCNC: 9.4 G/DL
IMM GRANULOCYTES NFR BLD AUTO: 0.4 %
LYMPHOCYTES # BLD AUTO: 1.42 K/UL
LYMPHOCYTES NFR BLD AUTO: 20.5 %
MAN DIFF?: NORMAL
MCHC RBC-ENTMCNC: 27.3 PG
MCHC RBC-ENTMCNC: 30.6 G/DL
MCV RBC AUTO: 89.2 FL
MONOCYTES # BLD AUTO: 0.35 K/UL
MONOCYTES NFR BLD AUTO: 5.1 %
NEUTROPHILS # BLD AUTO: 5.08 K/UL
NEUTROPHILS NFR BLD AUTO: 73.4 %
PLATELET # BLD AUTO: 140 K/UL
RBC # BLD: 3.44 M/UL
RBC # FLD: 15.1 %
WBC # FLD AUTO: 6.92 K/UL

## 2025-07-23 RX ORDER — CHLORHEXIDINE GLUCONATE 4 %
325 (65 FE) LIQUID (ML) TOPICAL
Qty: 30 | Refills: 2 | Status: ACTIVE | COMMUNITY
Start: 2025-07-23 | End: 1900-01-01

## 2025-08-21 ENCOUNTER — APPOINTMENT (OUTPATIENT)
Dept: OBGYN | Facility: CLINIC | Age: 35
End: 2025-08-21
Payer: MEDICAID

## 2025-08-21 VITALS
SYSTOLIC BLOOD PRESSURE: 90 MMHG | HEART RATE: 106 BPM | HEIGHT: 66 IN | DIASTOLIC BLOOD PRESSURE: 58 MMHG | BODY MASS INDEX: 36.48 KG/M2 | WEIGHT: 227 LBS

## 2025-08-21 DIAGNOSIS — Z34.90 ENCOUNTER FOR SUPERVISION OF NORMAL PREGNANCY, UNSPECIFIED, UNSPECIFIED TRIMESTER: ICD-10-CM

## 2025-08-21 PROCEDURE — 0502F SUBSEQUENT PRENATAL CARE: CPT

## 2025-08-21 PROCEDURE — 76816 OB US FOLLOW-UP PER FETUS: CPT

## 2025-08-21 PROCEDURE — 81003 URINALYSIS AUTO W/O SCOPE: CPT | Mod: QW

## 2025-09-09 ENCOUNTER — APPOINTMENT (OUTPATIENT)
Dept: OBGYN | Facility: CLINIC | Age: 35
End: 2025-09-09
Payer: MEDICAID

## 2025-09-09 VITALS
BODY MASS INDEX: 36.8 KG/M2 | HEART RATE: 84 BPM | DIASTOLIC BLOOD PRESSURE: 70 MMHG | WEIGHT: 228 LBS | SYSTOLIC BLOOD PRESSURE: 111 MMHG

## 2025-09-09 DIAGNOSIS — Z34.90 ENCOUNTER FOR SUPERVISION OF NORMAL PREGNANCY, UNSPECIFIED, UNSPECIFIED TRIMESTER: ICD-10-CM

## 2025-09-09 PROCEDURE — 81003 URINALYSIS AUTO W/O SCOPE: CPT | Mod: QW

## 2025-09-09 PROCEDURE — 0502F SUBSEQUENT PRENATAL CARE: CPT
